# Patient Record
Sex: FEMALE | Race: ASIAN | NOT HISPANIC OR LATINO | Employment: FULL TIME | URBAN - METROPOLITAN AREA
[De-identification: names, ages, dates, MRNs, and addresses within clinical notes are randomized per-mention and may not be internally consistent; named-entity substitution may affect disease eponyms.]

---

## 2017-01-12 ENCOUNTER — OFFICE VISIT (OUTPATIENT)
Dept: RHEUMATOLOGY | Facility: CLINIC | Age: 47
End: 2017-01-12
Attending: INTERNAL MEDICINE
Payer: COMMERCIAL

## 2017-01-12 VITALS
BODY MASS INDEX: 25.97 KG/M2 | DIASTOLIC BLOOD PRESSURE: 80 MMHG | SYSTOLIC BLOOD PRESSURE: 121 MMHG | OXYGEN SATURATION: 100 % | WEIGHT: 133 LBS | HEART RATE: 93 BPM

## 2017-01-12 DIAGNOSIS — R07.89 ATYPICAL CHEST PAIN: ICD-10-CM

## 2017-01-12 DIAGNOSIS — R06.09 DOE (DYSPNEA ON EXERTION): ICD-10-CM

## 2017-01-12 DIAGNOSIS — R12 PYROSIS: ICD-10-CM

## 2017-01-12 DIAGNOSIS — M35.1 MIXED CONNECTIVE TISSUE DISEASE (H): Primary | ICD-10-CM

## 2017-01-12 DIAGNOSIS — K21.9 GASTROESOPHAGEAL REFLUX DISEASE, ESOPHAGITIS PRESENCE NOT SPECIFIED: ICD-10-CM

## 2017-01-12 DIAGNOSIS — M35.1 MIXED CONNECTIVE TISSUE DISEASE (H): ICD-10-CM

## 2017-01-12 LAB
CARDIOLIPIN ANTIBODY IGG: NORMAL GPL-U/ML (ref 0–19.9)
CARDIOLIPIN ANTIBODY IGM: 0.6 MPL-U/ML (ref 0–19.9)

## 2017-01-12 PROCEDURE — 99212 OFFICE O/P EST SF 10 MIN: CPT | Mod: ZF

## 2017-01-12 PROCEDURE — 86235 NUCLEAR ANTIGEN ANTIBODY: CPT | Performed by: INTERNAL MEDICINE

## 2017-01-12 PROCEDURE — 86147 CARDIOLIPIN ANTIBODY EA IG: CPT | Performed by: INTERNAL MEDICINE

## 2017-01-12 PROCEDURE — 36415 COLL VENOUS BLD VENIPUNCTURE: CPT | Performed by: INTERNAL MEDICINE

## 2017-01-12 PROCEDURE — 86146 BETA-2 GLYCOPROTEIN ANTIBODY: CPT | Performed by: INTERNAL MEDICINE

## 2017-01-12 PROCEDURE — 85730 THROMBOPLASTIN TIME PARTIAL: CPT | Performed by: INTERNAL MEDICINE

## 2017-01-12 PROCEDURE — 00000401 ZZHCL STATISTIC THROMBIN TIME NC: Performed by: INTERNAL MEDICINE

## 2017-01-12 PROCEDURE — 00000167 ZZHCL STATISTIC INR NC: Performed by: INTERNAL MEDICINE

## 2017-01-12 PROCEDURE — 85613 RUSSELL VIPER VENOM DILUTED: CPT | Performed by: INTERNAL MEDICINE

## 2017-01-12 ASSESSMENT — PAIN SCALES - GENERAL: PAINLEVEL: NO PAIN (0)

## 2017-01-12 NOTE — PROGRESS NOTES
Rheumatology Visit     Stephanie Reed MRN# [id number]   YOB: 1970 Age: 43 year old       Primary care provider: Tori Severino          Assessment and Plan:   PROBLEM LIST:   1. Mixed connective tissue disease with high titer RNP positivity, low titer rheumatoid factor and clinically with predominantly scleroderma features with typical skin thickening and skin biopsy consistent with the diagnosis in 2006.   2. Good improvement of skin with phototherapy plus CellCept managed through Dr. Sarmiento.  3. Marked sicca symptoms improved with Evoxac.  4. GERD with normal gastric motility study, nonetheless requiring Prilosec for control.  5. Cardiopulmonary symptoms with history of slightly elevated NT-proBNP currently normal and essentially normal echocardiogram in December of 2007 showing only trace TR, but with recent development of several-minute long symptoms of palpitations and difficulty breathing.   6. Intermittently painful stiff joints somewhat improved with ibuprofen.  7. History of carpal tunnel syndrome.   8. Vitamin D deficiency.  9. Current worsening joint symptoms and allopecia.    ASSESSMENT:      Although she feels overall stable these symptoms of chest pain are potentially worrisome.  The fact that she has underlying MCTD makes the possibility that they are something concerning greater than were she not to have this diagnosis.      Nonetheless, I think the most likely explanation for these is that they are esophageal in nature.  Her history is a little inconsistent about the likelihood of this, but I think it is the most likely explanation.      In that regard, I have told her that I would like her to take Maalox when this happens and see if she gets fairly immediate relief of the symptoms.  In the meantime however, I do want to check a phospholipid antibody workup as this would increase my concern that this could be small PEs in the lungs that might not show up easily on most testing.      I  also want her to repeat PFTs.  If those show significant change, then I think we should to a repeat HRCT and can probably do CT PE protocol at the same time, although I think it is very low yield based on the story.      I will plan on seeing her back in 4-6 months and of course we will review the tests in the meantime.  She will call sooner if she is having worsening symptoms like this or other new symptoms.  Ultimately, if she is having worsening symptoms and they seem to localize to the esophagus, she will need an upper endoscopy, but I think that is premature based on the description of things today.                   History of Present Illness:   Stephanie Reed is a 43 year old female who presents for Followup of her MCTD with high-titer RNP antibodies, borderline rheumatoid factor, sclerodactyly, alopecia, joint symptoms, and low pulmonary volumes and DLCO on prior workup.     Since we saw her in July she has felt overall stable but several months ago had several episodes that became increasingly frequent, where she had a sensation of difficulty breathing and also some pain in her chest.  Because of this, she went to urgent care on one occasion and ultimately wound up some time later in the emergency room.  She was on a cardiac monitor and there is no evidence of any cardiac issues and I think she was ruled out by enzymes.  She also got a chest x-ray that was unremarkable.  Her history really was not very consistent with a PE and a D-dimer was normal, so no PE chest protocol was run.      She was having these episodes fairly frequently at that time and they have diminished, but she still gets them.  Although she has denied GERD at times in the past, she clearly validates that she has GERD at this point.  It is less clear whether there is a relationship between her GERD and these attacks but it sounds as though there may be.  She really does not feel profoundly short of breath and her chest pain is really  mid-sternal, indicating that it could be esophageal.  Looking back through her record, she had a CT scan of the chest done almost 10 years ago.  That did not show clear-cut patulous esophagus.  I have reviewed her chest x-rays and looked at those directly today and they do not show evidence of that either in any clear way, but of course, she did not have contrast with the chest CT making this less interpretable and she has never had a barium esophagram, nor has she had an upper endoscopy.      Her last PFTs were over a year ago now.  She does not really think she is having any increased dyspnea on exertion.      In general, her other symptoms have been stable.          She was complaining of significant joint pains last time and she thinks those are better and she also thinks her skin has gradually improved.  Alopecia has not worsened and may also be improved.      She continues to get some Raynaud's attacks with more severe cold weather, but denies any digital ulcers.      She denies any other significant clinical features typically associated with systemic sclerosis.       Remainder of the full 12-point review of systems including the specific scleroderma intake form which is appended is otherwise negative.             Review of Systems:   Review Of Systems  Skin: negative  Eyes: negative  Ears/Nose/Throat: negative  Respiratory: No shortness of breath, dyspnea on exertion, cough, or hemoptysis  Cardiovascular: negative  Gastrointestinal: negative  Genitourinary: negative  Musculoskeletal: negative  Neurologic: negative  Psychiatric: negative  Hematologic/Lymphatic/Immunologic: negative  Endocrine: negative          Past Medical History:     Past Medical History   Diagnosis Date     Carpal tunnel syndrome      Menorrhagia      with anemia     Nephrolithiasis      Heartburn      Iron deficiency anemia      Long-term use of Plaquenil      Dry eye        Patient Active Problem List    Diagnosis Date Noted     ERNST  "(dyspnea on exertion) 01/12/2017     Priority: Medium     Atypical chest pain 10/28/2016     Priority: Medium     Ordered Holter. Will try increasing PPI .        Bilateral low back pain with left-sided sciatica 07/13/2016     Priority: Medium     Low back pain 06/03/2015     Priority: Medium     Diagnosis updated by automated process. Provider to review and confirm.       Low back pain 05/30/2015     Priority: Medium     Scleroderma (H) 04/10/2014     Priority: Medium     LABS: MOISE >10. RNP positive. ds DNA negative.Jo1 negative. SSA negative. CK normal   CHEST CT: axillary adenopathy (2006)   ECHO: 12/04/07 EF 55%. RVSP normal Tr PG 14 mm Hg.   NM gastric emptying study: normal ;EGD 4/07 gastropathy   Skin biopsy (Jan 2007) c/w \"morphea.\" R forearm.        Seasonal allergic rhinitis 04/10/2014     Priority: Medium     Mixed connective tissue disease (H) 04/10/2014     Priority: Medium     Vitamin D deficiency 04/10/2014     Priority: Medium     Systemic lupus erythematosus (H) 04/10/2014     Priority: Medium     Sicca syndrome (H) 04/10/2014     Priority: Medium     Raynaud phenomenon 04/10/2014     Priority: Medium             Past Surgical History:     Past Surgical History   Procedure Laterality Date     Tubal ligation               Social History:     Social History   Substance Use Topics     Smoking status: Never Smoker      Smokeless tobacco: Not on file      Comment:  smokes outside only and not in the car      Alcohol Use: No             Family History:     Family History   Problem Relation Age of Onset     Adopted: Yes     Unknown/Adopted       Glaucoma No family hx of      Macular Degeneration No family hx of             Allergies:   No Known Allergies          Medications:     Current Outpatient Prescriptions   Medication Sig Dispense Refill     loratadine (CLARITIN) 10 MG tablet Take 1 tablet (10 mg) by mouth daily 30 tablet 11     hydroxychloroquine (PLAQUENIL) 200 MG tablet Take 1 tablet (200 " mg) by mouth daily (with dinner) Annual eye exams for monitoring. 30 tablet 3     ferrous sulfate 325 (65 FE) MG tablet Take 1 tablet (325 mg) by mouth 2 times daily 60 tablet 2     artificial tears SOLN Instill one drop twice daily              Physical Exam:   Blood pressure 121/80, pulse 93, weight 60.328 kg (133 lb), SpO2 100 %, not currently breastfeeding.  Constitutional: WD-WN-WG cooperative  Eyes: nl EOM, PERRLA, vision, conjunctiva, sclera  ENT: nl external ears, nose, hearing, lips, teeth, gums, throat  No mucous membrane lesions, normal saliva pool  Neck: no mass or thyroid enlargement  Resp: lungs clear to auscultation, nl to palpation  CV: RRR, no murmurs, rubs or gallops, no edema  GI: no ABD mass or tenderness, no HSM  : not tested  Lymph: no cervical, supraclavicular, inguinal or epitrochlear nodes  MS: Slight MCP/PIP fullness, with trace tenderness. All other TMJ, neck, shoulder, elbow, wrist, MCP/PIP/DIP, spine, hip, knee, ankle, and foot MTP/IP joints were examined and  found normal. No active synovitis or deformity. Full ROM.  Normal  strength. No dactylitis,  tenosynovitis, enthespathy   Skin: Distal sclerodactyly, with some distal digital pits. . Minimal if any skin thickening elsewhere. Allopecia areata.  no nail pitting, , rash, nodules or other lesions  Neuro: nl cranial nerves, strength, sensation, DTRs.   Psych: nl judgement, orientation, memory, affect.         Data:     Lab Results   Component Value Date    WBC 3.1* 12/22/2011    WBC 9.0 12/16/2010    WBC 2.5* 9/30/2010    HGB 10.5* 4/10/2014    HGB 11.1* 12/22/2011    HGB 11.6* 12/16/2010    HCT 35.1 4/10/2014    HCT 34.7* 12/22/2011    HCT 34.5* 12/16/2010    MCV 76.2* 4/10/2014    MCV 86 12/22/2011    MCV 86 12/16/2010     12/22/2011     12/16/2010     9/30/2010     Lab Results   Component Value Date    BUN 10 4/10/2014    BUN 18 3/8/2012    BUN 13 6/21/2011     No components found with this basename:  SEDRATE     Lab Results   Component Value Date    TSH 1.09 12/22/2011    TSH 1.28 10/20/2009    TSH 1.46 6/14/2006     Lab Results   Component Value Date    AST 30 4/10/2014    AST 37 12/22/2011    AST 23 6/21/2011    ALT 23 4/10/2014    ALT 22 12/22/2011    ALT 17 6/21/2011    ALKPHOS 47 12/22/2011    ALKPHOS 53 6/21/2011    ALKPHOS 62 9/14/2010     Reviewed Rheumatology lab flowsheet    Efrain Mckinney

## 2017-01-12 NOTE — MR AVS SNAPSHOT
After Visit Summary   1/12/2017    Stephanie Reed    MRN: [id number]           Patient Information     Date Of Birth          1970        Visit Information        Provider Department      1/12/2017 8:00 AM Efrain Mckinney MD [facility] Rheumatology        Today's Diagnoses     Mixed connective tissue disease (H)    -  1     ERNST (dyspnea on exertion)         Atypical chest pain         Gastroesophageal reflux disease, esophagitis presence not specified         Pyrosis            Follow-ups after your visit        Your next 10 appointments already scheduled     Jan 12, 2017  9:15 AM   Lab with  LAB   [facility] ([facility])    [address] [place] [id number] [phone]            Jan 18, 2017  8:30 AM   PFT VISIT with  PFL B   [facility] Pulmonary Function Testing ([facility])    [address] [place] [id number] [phone]            Jul 13, 2017  7:30 AM   (Arrive by 7:15 AM)   RETURN SCLERODERMA with Efrain Mckinney MD   [facility] Rheumatology ([facility])    [address] [place] [id number] [phone]              Future tests that were ordered for you today     Open Future Orders        Priority Expected Expires Ordered    LUPUS PANEL INCL PT, PTT, & TT Routine  1/12/2018 1/12/2017    Beta 2 Glycoprotein 1 Antibody IgG Routine  1/12/2018 1/12/2017    ERICK Panel (RNP, SMITH, Scleroderma, SSAB, SSBB); ERICK, Antibodies, Panel Routine  1/12/2018 1/12/2017    General PFT Lab (Please always keep checked) Routine  1/12/2018 1/12/2017    Pulmonary Function Test Routine  1/12/2018 1/12/2017            Who to contact     If you have questions or need follow up information about today's clinic visit or your schedule please contact [facility] RHEUMATOLOGY directly at [phone].  Normal or non-critical lab and imaging results will be  communicated to you by [employer], letter or phone within 4 business days after the clinic has received the results. If you do not hear from us within 7 days, please contact the clinic through [employer] or phone. If you have a critical or abnormal lab result, we will notify you by phone as soon as possible.  Submit refill requests through [employer] or call your pharmacy and they will forward the refill request to us. Please allow 3 business days for your refill to be completed.          Additional Information About Your Visit        [employer] Information     [employer] gives you secure access to your electronic health record. If you see a primary care provider, you can also send messages to your care team and make appointments. If you have questions, please call your primary care clinic.  If you do not have a primary care provider, please call [phone] and they will assist you.        Care EveryWhere ID     This is your Care EveryWhere ID. This could be used by other organizations to access your [facility] medical records  [id number]        Your Vitals Were     Pulse Pulse Oximetry                93 100%           Blood Pressure from Last 3 Encounters:   01/12/17 121/80   10/26/16 114/72   10/21/16 108/68    Weight from Last 3 Encounters:   01/12/17 60.328 kg (133 lb)   10/26/16 58.695 kg (129 lb 6.4 oz)   10/21/16 58.06 kg (128 lb)              We Performed the Following     Cardiolipin Zoe IgG and IgM        Primary Care Provider Office Phone # Fax #    Tori Severino [phone]       [facility] [address] [address]        Thank you!     Thank you for choosing [facility] RHEUMATOLOGY  for your care. Our goal is always to provide you with excellent care. Hearing back from our patients is one way we can continue to improve our services. Please take a few minutes to complete the written survey that you may receive in the mail after your visit with us. Thank you!              Your Updated Medication List - Protect others around you: Learn how to safely use, store and throw away your medicines at www.disposemymeds.org.          This list is accurate as of: 1/12/17  9:03 AM.  Always use your most recent med list.                   Brand Name Dispense Instructions for use    artificial tears Soln      Instill one drop twice daily       ferrous sulfate 325 (65 FE) MG tablet    IRON    60 tablet    Take 1 tablet (325 mg) by mouth 2 times daily       hydroxychloroquine 200 MG tablet    PLAQUENIL    30 tablet    Take 1 tablet (200 mg) by mouth daily (with dinner) Annual eye exams for monitoring.       loratadine 10 MG tablet    CLARITIN    30 tablet    Take 1 tablet (10 mg) by mouth daily

## 2017-01-12 NOTE — Clinical Note
1/12/2017      RE: Stephanie Reed  [address]  [facility] [address]       Rheumatology Visit     Stephanie Reed MRN# [id number]   YOB: 1970 Age: 43 year old       Primary care provider: Tori Severino          Assessment and Plan:   PROBLEM LIST:   1. Mixed connective tissue disease with high titer RNP positivity, low titer rheumatoid factor and clinically with predominantly scleroderma features with typical skin thickening and skin biopsy consistent with the diagnosis in 2006.   2. Good improvement of skin with phototherapy plus CellCept managed through Dr. Sarmiento.  3. Marked sicca symptoms improved with Evoxac.  4. GERD with normal gastric motility study, nonetheless requiring Prilosec for control.  5. Cardiopulmonary symptoms with history of slightly elevated NT-proBNP currently normal and essentially normal echocardiogram in December of 2007 showing only trace TR, but with recent development of several-minute long symptoms of palpitations and difficulty breathing.   6. Intermittently painful stiff joints somewhat improved with ibuprofen.  7. History of carpal tunnel syndrome.   8. Vitamin D deficiency.  9. Current worsening joint symptoms and allopecia.    ASSESSMENT:      Although she feels overall stable these symptoms of chest pain are potentially worrisome.  The fact that she has underlying MCTD makes the possibility that they are something concerning greater than were she not to have this diagnosis.      Nonetheless, I think the most likely explanation for these is that they are esophageal in nature.  Her history is a little inconsistent about the likelihood of this, but I think it is the most likely explanation.      In that regard, I have told her that I would like her to take Maalox when this happens and see if she gets fairly immediate relief of the symptoms.  In the meantime however, I do want to check a phospholipid antibody workup as this would increase my concern that this could  be small PEs in the lungs that might not show up easily on most testing.      I also want her to repeat PFTs.  If those show significant change, then I think we should to a repeat HRCT and can probably do CT PE protocol at the same time, although I think it is very low yield based on the story.      I will plan on seeing her back in 4-6 months and of course we will review the tests in the meantime.  She will call sooner if she is having worsening symptoms like this or other new symptoms.  Ultimately, if she is having worsening symptoms and they seem to localize to the esophagus, she will need an upper endoscopy, but I think that is premature based on the description of things today.                   History of Present Illness:   Stephanie Reed is a 43 year old female who presents for Followup of her MCTD with high-titer RNP antibodies, borderline rheumatoid factor, sclerodactyly, alopecia, joint symptoms, and low pulmonary volumes and DLCO on prior workup.     Since we saw her in July she has felt overall stable but several months ago had several episodes that became increasingly frequent, where she had a sensation of difficulty breathing and also some pain in her chest.  Because of this, she went to urgent care on one occasion and ultimately wound up some time later in the emergency room.  She was on a cardiac monitor and there is no evidence of any cardiac issues and I think she was ruled out by enzymes.  She also got a chest x-ray that was unremarkable.  Her history really was not very consistent with a PE and a D-dimer was normal, so no PE chest protocol was run.      She was having these episodes fairly frequently at that time and they have diminished, but she still gets them.  Although she has denied GERD at times in the past, she clearly validates that she has GERD at this point.  It is less clear whether there is a relationship between her GERD and these attacks but it sounds as though there may be.  She  really does not feel profoundly short of breath and her chest pain is really mid-sternal, indicating that it could be esophageal.  Looking back through her record, she had a CT scan of the chest done almost 10 years ago.  That did not show clear-cut patulous esophagus.  I have reviewed her chest x-rays and looked at those directly today and they do not show evidence of that either in any clear way, but of course, she did not have contrast with the chest CT making this less interpretable and she has never had a barium esophagram, nor has she had an upper endoscopy.      Her last PFTs were over a year ago now.  She does not really think she is having any increased dyspnea on exertion.      In general, her other symptoms have been stable.          She was complaining of significant joint pains last time and she thinks those are better and she also thinks her skin has gradually improved.  Alopecia has not worsened and may also be improved.      She continues to get some Raynaud's attacks with more severe cold weather, but denies any digital ulcers.      She denies any other significant clinical features typically associated with systemic sclerosis.       Remainder of the full 12-point review of systems including the specific scleroderma intake form which is appended is otherwise negative.             Review of Systems:   Review Of Systems  Skin: negative  Eyes: negative  Ears/Nose/Throat: negative  Respiratory: No shortness of breath, dyspnea on exertion, cough, or hemoptysis  Cardiovascular: negative  Gastrointestinal: negative  Genitourinary: negative  Musculoskeletal: negative  Neurologic: negative  Psychiatric: negative  Hematologic/Lymphatic/Immunologic: negative  Endocrine: negative          Past Medical History:     Past Medical History   Diagnosis Date     Carpal tunnel syndrome      Menorrhagia      with anemia     Nephrolithiasis      Heartburn      Iron deficiency anemia      Long-term use of Plaquenil       "Dry eye        Patient Active Problem List    Diagnosis Date Noted     ERNST (dyspnea on exertion) 01/12/2017     Priority: Medium     Atypical chest pain 10/28/2016     Priority: Medium     Ordered Holter. Will try increasing PPI .        Bilateral low back pain with left-sided sciatica 07/13/2016     Priority: Medium     Low back pain 06/03/2015     Priority: Medium     Diagnosis updated by automated process. Provider to review and confirm.       Low back pain 05/30/2015     Priority: Medium     Scleroderma (H) 04/10/2014     Priority: Medium     LABS: MOISE >10. RNP positive. ds DNA negative.Jo1 negative. SSA negative. CK normal   CHEST CT: axillary adenopathy (2006)   ECHO: 12/04/07 EF 55%. RVSP normal Tr PG 14 mm Hg.   NM gastric emptying study: normal ;EGD 4/07 gastropathy   Skin biopsy (Jan 2007) c/w \"morphea.\" R forearm.        Seasonal allergic rhinitis 04/10/2014     Priority: Medium     Mixed connective tissue disease (H) 04/10/2014     Priority: Medium     Vitamin D deficiency 04/10/2014     Priority: Medium     Systemic lupus erythematosus (H) 04/10/2014     Priority: Medium     Sicca syndrome (H) 04/10/2014     Priority: Medium     Raynaud phenomenon 04/10/2014     Priority: Medium             Past Surgical History:     Past Surgical History   Procedure Laterality Date     Tubal ligation               Social History:     Social History   Substance Use Topics     Smoking status: Never Smoker      Smokeless tobacco: Not on file      Comment:  smokes outside only and not in the car      Alcohol Use: No             Family History:     Family History   Problem Relation Age of Onset     Adopted: Yes     Unknown/Adopted       Glaucoma No family hx of      Macular Degeneration No family hx of             Allergies:   No Known Allergies          Medications:     Current Outpatient Prescriptions   Medication Sig Dispense Refill     loratadine (CLARITIN) 10 MG tablet Take 1 tablet (10 mg) by mouth daily 30 " tablet 11     hydroxychloroquine (PLAQUENIL) 200 MG tablet Take 1 tablet (200 mg) by mouth daily (with dinner) Annual eye exams for monitoring. 30 tablet 3     ferrous sulfate 325 (65 FE) MG tablet Take 1 tablet (325 mg) by mouth 2 times daily 60 tablet 2     artificial tears SOLN Instill one drop twice daily              Physical Exam:   Blood pressure 121/80, pulse 93, weight 60.328 kg (133 lb), SpO2 100 %, not currently breastfeeding.  Constitutional: WD-WN-WG cooperative  Eyes: nl EOM, PERRLA, vision, conjunctiva, sclera  ENT: nl external ears, nose, hearing, lips, teeth, gums, throat  No mucous membrane lesions, normal saliva pool  Neck: no mass or thyroid enlargement  Resp: lungs clear to auscultation, nl to palpation  CV: RRR, no murmurs, rubs or gallops, no edema  GI: no ABD mass or tenderness, no HSM  : not tested  Lymph: no cervical, supraclavicular, inguinal or epitrochlear nodes  MS: Slight MCP/PIP fullness, with trace tenderness. All other TMJ, neck, shoulder, elbow, wrist, MCP/PIP/DIP, spine, hip, knee, ankle, and foot MTP/IP joints were examined and  found normal. No active synovitis or deformity. Full ROM.  Normal  strength. No dactylitis,  tenosynovitis, enthespathy   Skin: Distal sclerodactyly, with some distal digital pits. . Minimal if any skin thickening elsewhere. Allopecia areata.  no nail pitting, , rash, nodules or other lesions  Neuro: nl cranial nerves, strength, sensation, DTRs.   Psych: nl judgement, orientation, memory, affect.         Data:     Lab Results   Component Value Date    WBC 3.1* 12/22/2011    WBC 9.0 12/16/2010    WBC 2.5* 9/30/2010    HGB 10.5* 4/10/2014    HGB 11.1* 12/22/2011    HGB 11.6* 12/16/2010    HCT 35.1 4/10/2014    HCT 34.7* 12/22/2011    HCT 34.5* 12/16/2010    MCV 76.2* 4/10/2014    MCV 86 12/22/2011    MCV 86 12/16/2010     12/22/2011     12/16/2010     9/30/2010     Lab Results   Component Value Date    BUN 10 4/10/2014    BUN 18  3/8/2012    BUN 13 6/21/2011     No components found with this basename: SEDRATE     Lab Results   Component Value Date    TSH 1.09 12/22/2011    TSH 1.28 10/20/2009    TSH 1.46 6/14/2006     Lab Results   Component Value Date    AST 30 4/10/2014    AST 37 12/22/2011    AST 23 6/21/2011    ALT 23 4/10/2014    ALT 22 12/22/2011    ALT 17 6/21/2011    ALKPHOS 47 12/22/2011    ALKPHOS 53 6/21/2011    ALKPHOS 62 9/14/2010     Reviewed Rheumatology lab flowsheet    Efrain Mckinney MD

## 2017-01-13 LAB
ENA RNP IGG SER IA-ACNC: ABNORMAL AI (ref 0–0.9)
ENA SCL70 IGG SER IA-ACNC: 0.2 AI (ref 0–0.9)
ENA SM IGG SER-ACNC: 0.2 AI (ref 0–0.9)
ENA SS-A IGG SER IA-ACNC: 2.3 AI (ref 0–0.9)
ENA SS-B IGG SER IA-ACNC: ABNORMAL AI (ref 0–0.9)
LA PPP-IMP: NORMAL

## 2017-01-17 LAB — B2 GLYCOPROT1 IGG SERPL IA-ACNC: NORMAL U/ML

## 2017-01-18 DIAGNOSIS — R12 PYROSIS: ICD-10-CM

## 2017-01-18 DIAGNOSIS — R06.09 DOE (DYSPNEA ON EXERTION): ICD-10-CM

## 2017-01-18 DIAGNOSIS — M35.1 MIXED CONNECTIVE TISSUE DISEASE (H): ICD-10-CM

## 2017-01-18 DIAGNOSIS — K21.9 GASTROESOPHAGEAL REFLUX DISEASE, ESOPHAGITIS PRESENCE NOT SPECIFIED: ICD-10-CM

## 2017-01-18 DIAGNOSIS — R07.89 ATYPICAL CHEST PAIN: ICD-10-CM

## 2017-02-13 LAB
DLCOUNC-%PRED-PRE: 83 %
DLCOUNC-PRE: 17.41 ML/MIN/MMHG
DLCOUNC-PRED: 20.77 ML/MIN/MMHG
ERV-%PRED-PRE: 13 %
ERV-PRE: 0.1 L
ERV-PRED: 0.74 L
EXPTIME-PRE: 7.19 SEC
FEF2575-%PRED-PRE: 131 %
FEF2575-PRE: 3.13 L/SEC
FEF2575-PRED: 2.38 L/SEC
FEFMAX-%PRED-PRE: 114 %
FEFMAX-PRE: 6.86 L/SEC
FEFMAX-PRED: 6 L/SEC
FEV1-%PRED-PRE: 89 %
FEV1-PRE: 1.96 L
FEV1FEV6-PRE: 89 %
FEV1FEV6-PRED: 83 %
FEV1FVC-PRE: 88 %
FEV1FVC-PRED: 83 %
FEV1SVC-PRE: 84 %
FEV1SVC-PRED: 77 %
FIFMAX-PRE: 3.36 L/SEC
FRCPLETH-%PRED-PRE: 66 %
FRCPLETH-PRE: 1.58 L
FRCPLETH-PRED: 2.38 L
FVC-%PRED-PRE: 84 %
FVC-PRE: 2.22 L
FVC-PRED: 2.64 L
IC-%PRED-PRE: 106 %
IC-PRE: 2.23 L
IC-PRED: 2.09 L
RVPLETH-%PRED-PRE: 102 %
RVPLETH-PRE: 1.48 L
RVPLETH-PRED: 1.45 L
TLCPLETH-%PRED-PRE: 94 %
TLCPLETH-PRE: 3.81 L
TLCPLETH-PRED: 4.02 L
VA-%PRED-PRE: 72 %
VA-PRE: 3 L
VC-%PRED-PRE: 82 %
VC-PRE: 2.33 L
VC-PRED: 2.83 L

## 2017-06-24 ENCOUNTER — HEALTH MAINTENANCE LETTER (OUTPATIENT)
Age: 47
End: 2017-06-24

## 2017-08-03 ENCOUNTER — OFFICE VISIT (OUTPATIENT)
Dept: RHEUMATOLOGY | Facility: CLINIC | Age: 47
End: 2017-08-03
Attending: INTERNAL MEDICINE
Payer: COMMERCIAL

## 2017-08-03 VITALS
HEART RATE: 79 BPM | HEIGHT: 59 IN | OXYGEN SATURATION: 100 % | BODY MASS INDEX: 25.96 KG/M2 | DIASTOLIC BLOOD PRESSURE: 77 MMHG | WEIGHT: 128.8 LBS | SYSTOLIC BLOOD PRESSURE: 118 MMHG

## 2017-08-03 DIAGNOSIS — M34.9 SCLERODERMA (H): ICD-10-CM

## 2017-08-03 DIAGNOSIS — M35.1 MIXED CONNECTIVE TISSUE DISEASE (H): ICD-10-CM

## 2017-08-03 DIAGNOSIS — M35.00 SICCA SYNDROME (H): ICD-10-CM

## 2017-08-03 DIAGNOSIS — M32.8 OTHER FORMS OF SYSTEMIC LUPUS ERYTHEMATOSUS (H): ICD-10-CM

## 2017-08-03 DIAGNOSIS — E55.9 VITAMIN D DEFICIENCY: ICD-10-CM

## 2017-08-03 PROCEDURE — 99212 OFFICE O/P EST SF 10 MIN: CPT | Mod: ZF

## 2017-08-03 RX ORDER — HYDROXYCHLOROQUINE SULFATE 200 MG/1
200 TABLET, FILM COATED ORAL
Qty: 30 TABLET | Refills: 3 | Status: SHIPPED | OUTPATIENT
Start: 2017-08-03 | End: 2018-08-02

## 2017-08-03 ASSESSMENT — PAIN SCALES - GENERAL: PAINLEVEL: NO PAIN (0)

## 2017-08-03 NOTE — MR AVS SNAPSHOT
After Visit Summary   8/3/2017    Stephanie Reed    MRN: [id number]           Patient Information     Date Of Birth          1970        Visit Information        Provider Department      8/3/2017 8:00 AM Efrain Mckinney MD [facility] Rheumatology        Today's Diagnoses     Scleroderma (H)        Mixed connective tissue disease (H)        Other forms of systemic lupus erythematosus (H)        Sicca syndrome (H)        Vitamin D deficiency          Care Instructions    Make an appointment with Dr. Logan in ophthalmology to screen for Plaquenil side effects.    Pumice stone.          Follow-ups after your visit        Your next 10 appointments already scheduled     Aug 16, 2017  8:20 AM CDT   PHYSICAL with Tori Severino MD   [facility] ([facility])    [address],  [address]  [facility] [address]   [phone]            Sep 19, 2017  9:45 AM CDT   RETURN GENERAL with Craig Logan MD   [facility] Clinic ([facility])    [facility]  [address]  [address]  [facility] [address] [phone]            Feb 01, 2018  7:00 AM CST   (Arrive by 6:45 AM)   RETURN SCLERODERMA with Efrain Mckinney MD   [facility] ([facility])    [address]  [address] [facility] [id number] [phone]              Who to contact     If you have questions or need follow up information about today's clinic visit or your schedule please contact [facility] RHEUMATOLOG[facility] directly at [phone].  Normal or non-critical lab and imaging results will be communicated to you by MyChart, letter or phone within 4 business days after the clinic has received the results. If you do not hear from us within 7 days, please contact the clinic through MyChart or phone. If you have a critical or abnormal lab result, we will notify you by phone as soon as possible.  Submit refill requests through [facility] or call your  "pharmacy and they will forward the refill request to us. Please allow 3 business days for your refill to be completed.          Additional Information About Your Visit        MyChart Information     [employer] gives you secure access to your electronic health record. If you see a primary care provider, you can also send messages to your care team and make appointments. If you have questions, please call your primary care clinic.  If you do not have a primary care provider, please call [phone] and they will assist you.        Care EveryWhere ID     This is your Care EveryWhere ID. This could be used by other organizations to access your [facility] medical records  [id number]        Your Vitals Were     Pulse Height Pulse Oximetry BMI (Body Mass Index)          79 1.492 m (4' 10.75\") 100% 26.24 kg/m2         Blood Pressure from Last 3 Encounters:   08/03/17 118/77   01/12/17 121/80   10/26/16 114/72    Weight from Last 3 Encounters:   08/03/17 58.4 kg (128 lb 12.8 oz)   01/12/17 60.3 kg (133 lb)   10/26/16 58.7 kg (129 lb 6.4 oz)              Today, you had the following     No orders found for display         Where to get your medicines      These medications were sent to [facility] [address] [place] [id number]     Phone:  [phone]     hydroxychloroquine 200 MG tablet          Primary Care Provider Office Phone # Fax #    Tori Severino [phone]       [facility] [address] [address]        Equal Access to Services     [facility] : Hadii aad ku hadasho Soomaali, waaxda luqadaha, qaybta kaalmada adeashantiyasugey, yogi long. So [facility] [phone].    ATENCIÓN: Si habla español, tiene a miller disposición servicios gratuitos de asistencia lingüística. Llame al [phone].    We comply with applicable federal civil rights laws and [place] laws. We do not " discriminate on the basis of race, color, national origin, age, disability sex, sexual orientation or gender identity.            Thank you!     Thank you for choosing [facility] RHEUMATOLOG[facility]  for your care. Our goal is always to provide you with excellent care. Hearing back from our patients is one way we can continue to improve our services. Please take a few minutes to complete the written survey that you may receive in the mail after your visit with us. Thank you!             Your Updated Medication List - Protect others around you: Learn how to safely use, store and throw away your medicines at www.disposemymeds.org.          This list is accurate as of: 8/3/17  9:07 AM.  Always use your most recent med list.                   Brand Name Dispense Instructions for use Diagnosis    artificial tears Soln      Instill one drop twice daily        ferrous sulfate 325 (65 FE) MG tablet    IRON    60 tablet    Take 1 tablet (325 mg) by mouth 2 times daily    Iron deficiency anemia       hydroxychloroquine 200 MG tablet    PLAQUENIL    30 tablet    Take 1 tablet (200 mg) by mouth daily (with dinner) Annual eye exams for monitoring.    Scleroderma (H), Mixed connective tissue disease (H), Other forms of systemic lupus erythematosus (H), Sicca syndrome (H), Vitamin D deficiency       loratadine 10 MG tablet    CLARITIN    30 tablet    Take 1 tablet (10 mg) by mouth daily    Other allergic rhinitis

## 2017-08-03 NOTE — LETTER
8/3/2017      RE: Stephanie Reed  [address]  [facility] [address]       Rheumatology Visit     Stephanie Reed MRN# [id number]   YOB: 1970 Age: 43 year old       Primary care provider: Tori Severino          Assessment and Plan:   PROBLEM LIST:   1. Mixed connective tissue disease with high titer RNP positivity, low titer rheumatoid factor and clinically with predominantly scleroderma features with typical skin thickening and skin biopsy consistent with the diagnosis in 2006.   2. Good improvement of skin with phototherapy plus CellCept managed through Dr. Sarmiento.  3. Marked sicca symptoms improved with Evoxac.  4. GERD with normal gastric motility study, nonetheless requiring Prilosec for control.  5. Cardiopulmonary symptoms with history of slightly elevated NT-proBNP currently normal and essentially normal echocardiogram in December of 2007 showing only trace TR, but with recent development of several-minute long symptoms of palpitations and difficulty breathing.   6. Intermittently painful stiff joints somewhat improved with ibuprofen.  7. History of carpal tunnel syndrome.   8. Vitamin D deficiency.  9. Current worsening joint symptoms and allopecia.    ASSESSMENT:      Overall Stephanie is essentially stable and has almost no residual sequelae from her MCTD. She is on Plaquenil therapy once daily and is due for an eye exam this summer.    She does have brief morning stiffness and numbness, the latter of which could represent nerve entrapment, but it is not particularly bothersome to her. Her Raynaud's is similar and a calcium channel blocker could be considered in the future if her symptoms become disabling during the winter months.    Will plan on seeing her back in 6-12 months for monitoring.     Seen and discussed with Dr. Mckinney.    Larry Blackwood MD  Rheumatology Fellow  [phone]         History of Present Illness:   Stephanie Reed is a 43 year old female who presents for Followup of  her MCTD with high-titer RNP antibodies, borderline rheumatoid factor, sclerodactyly, alopecia, joint symptoms, and low pulmonary volumes and DLCO on prior workup.     Since we saw her in January she has felt well.      She continues to get some Raynaud's attacks with more severe cold weather, but denies any digital ulcers. The attacks do not bother her well being or her ability to work and they resolve, usually within 15-20 minutes, with warming lifestyle measures.    Her hands are slightly stiff and even numb in the morning and sometimes towards the end of her sleep and it wakes her early. Her skin is largely normal now and she has been off CellCept for many years. She has not developed any new rashes or skin changes.    The chest pain she was worked up for last year was largely gone until last week when she had another brief episode that lasted under a minute and occurred while she was at work. It was on the right side of her chest and not associated with any shortness of breath.    She is not having any difficulty swallowing solids or liquids or having either get stuck. Breathing is normal and unencumbered. She took a trip to [place] for her niece's wedding and walked around [place] for 3 days without any problems. She saw some kangaroos but not a koala.          Review of Systems:   Skin: negative  Eyes: negative  Ears/Nose/Throat: negative  Respiratory: No shortness of breath, dyspnea on exertion, cough, or hemoptysis  Cardiovascular: negative  Gastrointestinal: negative  Genitourinary: negative  Musculoskeletal: negative  Neurologic: negative  Psychiatric: negative  Hematologic/Lymphatic/Immunologic: negative  Endocrine: negative          Past Medical History:     Past Medical History:   Diagnosis Date     Carpal tunnel syndrome      Dry eye      Heartburn      Iron deficiency anemia      Long-term use of Plaquenil      Menorrhagia     with anemia     Nephrolithiasis      Patient Active Problem List     "Diagnosis Date Noted     ERNST (dyspnea on exertion) 01/12/2017     Priority: Medium     Atypical chest pain 10/28/2016     Priority: Medium     Ordered Holter. Will try increasing PPI .        Bilateral low back pain with left-sided sciatica 07/13/2016     Priority: Medium     Low back pain 06/03/2015     Priority: Medium     Diagnosis updated by automated process. Provider to review and confirm.       Low back pain 05/30/2015     Priority: Medium     Scleroderma (H) 04/10/2014     Priority: Medium     LABS: MOISE >10. RNP positive. ds DNA negative.Jo1 negative. SSA negative. CK normal   CHEST CT: axillary adenopathy (2006)   ECHO: 12/04/07 EF 55%. RVSP normal Tr PG 14 mm Hg.   NM gastric emptying study: normal ;EGD 4/07 gastropathy   Skin biopsy (Jan 2007) c/w \"morphea.\" R forearm.        Seasonal allergic rhinitis 04/10/2014     Priority: Medium     Mixed connective tissue disease (H) 04/10/2014     Priority: Medium     Vitamin D deficiency 04/10/2014     Priority: Medium     Systemic lupus erythematosus (H) 04/10/2014     Priority: Medium     Sicca syndrome (H) 04/10/2014     Priority: Medium     Raynaud phenomenon 04/10/2014     Priority: Medium          Past Surgical History:     Past Surgical History:   Procedure Laterality Date     TUBAL LIGATION            Social History:     Social History   Substance Use Topics     Smoking status: Never Smoker     Smokeless tobacco: Not on file      Comment:  smokes outside only and not in the car      Alcohol use No          Family History:     Family History   Problem Relation Age of Onset     Adopted: Yes     Unknown/Adopted       Glaucoma No family hx of      Macular Degeneration No family hx of           Allergies:   No Known Allergies          Medications:     Current Outpatient Prescriptions   Medication Sig Dispense Refill     hydroxychloroquine (PLAQUENIL) 200 MG tablet Take 1 tablet (200 mg) by mouth daily (with dinner) Annual eye exams for monitoring. 30 " "tablet 3     loratadine (CLARITIN) 10 MG tablet Take 1 tablet (10 mg) by mouth daily 30 tablet 11     artificial tears SOLN Instill one drop twice daily       ferrous sulfate 325 (65 FE) MG tablet Take 1 tablet (325 mg) by mouth 2 times daily (Patient not taking: Reported on 8/3/2017) 60 tablet 2          Physical Exam:   Blood pressure 118/77, pulse 79, height 1.492 m (4' 10.75\"), weight 58.4 kg (128 lb 12.8 oz), SpO2 100 %, not currently breastfeeding.  Constitutional: WD-WN-WG cooperative  Eyes: nl EOM, PERRLA, vision, conjunctiva, sclera  ENT: nl external ears, nose, hearing, lips, teeth, gums, throat  No mucous membrane lesions, normal saliva pool  Neck: no mass or thyroid enlargement  Resp: lungs clear to auscultation, nl to palpation  CV: RRR, no murmurs, rubs or gallops, no edema  GI: no ABD mass or tenderness, no HSM  : not tested  Lymph: no cervical, supraclavicular, or epitrochlear nodes  MS: Slight MCP/PIP fullness, with trace tenderness. All other TMJ, neck, shoulder, elbow, wrist, MCP/PIP/DIP, spine, hip, knee, ankle, and foot MTP/IP joints were examined and  found normal. No active synovitis or deformity. Full ROM.  Normal  strength. No dactylitis,  tenosynovitis, enthespathy   Skin: Minimal sclerodactyly if any. Mild synovial hypertrophy MCP 2-3 bilaterally. Allopecia areata.  no nail pitting, , rash, nodules or other lesions  Neuro: nl cranial nerves, strength, sensation, DTRs.   Psych: nl judgement, orientation, memory, affect.         Data:   Results for YUE JAIMES (MRN [id number]) as of 8/3/2017 09:18   Ref. Range 1/12/2017 10:07   Beta 2 Glycoprotein 1 Antibody IgG Latest Ref Range: <7 U/mL <0.6...   RNP Antibody IgG Latest Ref Range: 0.0 - 0.9 AI >8.0... (H)   Scleroderma Antibody Scl-70 ERICK IgG Latest Ref Range: 0.0 - 0.9 AI 0.2   Dan ERICK Antibody IgG Latest Ref Range: 0.0 - 0.9 AI 0.2   SSA (Ro) (ERICK) Antibody, IgG Latest Ref Range: 0.0 - 0.9 AI 2.3 (H)   SSB (La) (ERICK) Antibody, " IgG Latest Ref Range: 0.0 - 0.9 AI <0.2...     Results for YUE JAIMES (MRN [id number]) as of 8/3/2017 09:18   Ref. Range 1/12/2017 10:08   Cardiolipin Antibody IgG Latest Ref Range: 0.0 - 19.9 GPL-U/mL <1.6...   Cardiolipin Antibody IgM Latest Ref Range: 0.0 - 19.9 MPL-U/mL 0.6   Results for YUE JAIMES (MRN [id number]) as of 8/3/2017 09:18   Ref. Range 1/12/2017 10:07   Lupus Result Latest Ref Range: NEG  Negative...     Reviewed Rheumatology lab flowsheet      I saw the patient with the fellow.  My exam and recomendations are as described.        Efrain Mckinney MD

## 2017-08-03 NOTE — PROGRESS NOTES
Rheumatology Visit     Stephanie Reed MRN# [id number]   YOB: 1970 Age: 43 year old       Primary care provider: Tori Severino          Assessment and Plan:   PROBLEM LIST:   1. Mixed connective tissue disease with high titer RNP positivity, low titer rheumatoid factor and clinically with predominantly scleroderma features with typical skin thickening and skin biopsy consistent with the diagnosis in 2006.   2. Good improvement of skin with phototherapy plus CellCept managed through Dr. Sarmiento.  3. Marked sicca symptoms improved with Evoxac.  4. GERD with normal gastric motility study, nonetheless requiring Prilosec for control.  5. Cardiopulmonary symptoms with history of slightly elevated NT-proBNP currently normal and essentially normal echocardiogram in December of 2007 showing only trace TR, but with recent development of several-minute long symptoms of palpitations and difficulty breathing.   6. Intermittently painful stiff joints somewhat improved with ibuprofen.  7. History of carpal tunnel syndrome.   8. Vitamin D deficiency.  9. Current worsening joint symptoms and allopecia.    ASSESSMENT:      Overall Stephanie is essentially stable and has almost no residual sequelae from her MCTD. She is on Plaquenil therapy once daily and is due for an eye exam this summer.    She does have brief morning stiffness and numbness, the latter of which could represent nerve entrapment, but it is not particularly bothersome to her. Her Raynaud's is similar and a calcium channel blocker could be considered in the future if her symptoms become disabling during the winter months.    Will plan on seeing her back in 6-12 months for monitoring.     Seen and discussed with Dr. Mckinney.    Larry Blackwood MD  Rheumatology Fellow  [phone]         History of Present Illness:   Stephanie Reed is a 43 year old female who presents for Followup of her MCTD with high-titer RNP antibodies, borderline rheumatoid factor,  sclerodactyly, alopecia, joint symptoms, and low pulmonary volumes and DLCO on prior workup.     Since we saw her in January she has felt well.      She continues to get some Raynaud's attacks with more severe cold weather, but denies any digital ulcers. The attacks do not bother her well being or her ability to work and they resolve, usually within 15-20 minutes, with warming lifestyle measures.    Her hands are slightly stiff and even numb in the morning and sometimes towards the end of her sleep and it wakes her early. Her skin is largely normal now and she has been off CellCept for many years. She has not developed any new rashes or skin changes.    The chest pain she was worked up for last year was largely gone until last week when she had another brief episode that lasted under a minute and occurred while she was at work. It was on the right side of her chest and not associated with any shortness of breath.    She is not having any difficulty swallowing solids or liquids or having either get stuck. Breathing is normal and unencumbered. She took a trip to [place] for her niece's wedding and walked around [place] for 3 days without any problems. She saw some kangaroos but not a koala.          Review of Systems:   Skin: negative  Eyes: negative  Ears/Nose/Throat: negative  Respiratory: No shortness of breath, dyspnea on exertion, cough, or hemoptysis  Cardiovascular: negative  Gastrointestinal: negative  Genitourinary: negative  Musculoskeletal: negative  Neurologic: negative  Psychiatric: negative  Hematologic/Lymphatic/Immunologic: negative  Endocrine: negative          Past Medical History:     Past Medical History:   Diagnosis Date     Carpal tunnel syndrome      Dry eye      Heartburn      Iron deficiency anemia      Long-term use of Plaquenil      Menorrhagia     with anemia     Nephrolithiasis      Patient Active Problem List    Diagnosis Date Noted     ERNST (dyspnea on exertion) 01/12/2017     Priority:  "Medium     Atypical chest pain 10/28/2016     Priority: Medium     Ordered Holter. Will try increasing PPI .        Bilateral low back pain with left-sided sciatica 07/13/2016     Priority: Medium     Low back pain 06/03/2015     Priority: Medium     Diagnosis updated by automated process. Provider to review and confirm.       Low back pain 05/30/2015     Priority: Medium     Scleroderma (H) 04/10/2014     Priority: Medium     LABS: MOISE >10. RNP positive. ds DNA negative.Jo1 negative. SSA negative. CK normal   CHEST CT: axillary adenopathy (2006)   ECHO: 12/04/07 EF 55%. RVSP normal Tr PG 14 mm Hg.   NM gastric emptying study: normal ;EGD 4/07 gastropathy   Skin biopsy (Jan 2007) c/w \"morphea.\" R forearm.        Seasonal allergic rhinitis 04/10/2014     Priority: Medium     Mixed connective tissue disease (H) 04/10/2014     Priority: Medium     Vitamin D deficiency 04/10/2014     Priority: Medium     Systemic lupus erythematosus (H) 04/10/2014     Priority: Medium     Sicca syndrome (H) 04/10/2014     Priority: Medium     Raynaud phenomenon 04/10/2014     Priority: Medium          Past Surgical History:     Past Surgical History:   Procedure Laterality Date     TUBAL LIGATION            Social History:     Social History   Substance Use Topics     Smoking status: Never Smoker     Smokeless tobacco: Not on file      Comment:  smokes outside only and not in the car      Alcohol use No          Family History:     Family History   Problem Relation Age of Onset     Adopted: Yes     Unknown/Adopted       Glaucoma No family hx of      Macular Degeneration No family hx of           Allergies:   No Known Allergies          Medications:     Current Outpatient Prescriptions   Medication Sig Dispense Refill     hydroxychloroquine (PLAQUENIL) 200 MG tablet Take 1 tablet (200 mg) by mouth daily (with dinner) Annual eye exams for monitoring. 30 tablet 3     loratadine (CLARITIN) 10 MG tablet Take 1 tablet (10 mg) by mouth " "daily 30 tablet 11     artificial tears SOLN Instill one drop twice daily       ferrous sulfate 325 (65 FE) MG tablet Take 1 tablet (325 mg) by mouth 2 times daily (Patient not taking: Reported on 8/3/2017) 60 tablet 2          Physical Exam:   Blood pressure 118/77, pulse 79, height 1.492 m (4' 10.75\"), weight 58.4 kg (128 lb 12.8 oz), SpO2 100 %, not currently breastfeeding.  Constitutional: WD-WN-WG cooperative  Eyes: nl EOM, PERRLA, vision, conjunctiva, sclera  ENT: nl external ears, nose, hearing, lips, teeth, gums, throat  No mucous membrane lesions, normal saliva pool  Neck: no mass or thyroid enlargement  Resp: lungs clear to auscultation, nl to palpation  CV: RRR, no murmurs, rubs or gallops, no edema  GI: no ABD mass or tenderness, no HSM  : not tested  Lymph: no cervical, supraclavicular, or epitrochlear nodes  MS: Slight MCP/PIP fullness, with trace tenderness. All other TMJ, neck, shoulder, elbow, wrist, MCP/PIP/DIP, spine, hip, knee, ankle, and foot MTP/IP joints were examined and  found normal. No active synovitis or deformity. Full ROM.  Normal  strength. No dactylitis,  tenosynovitis, enthespathy   Skin: Minimal sclerodactyly if any. Mild synovial hypertrophy MCP 2-3 bilaterally. Allopecia areata.  no nail pitting, , rash, nodules or other lesions  Neuro: nl cranial nerves, strength, sensation, DTRs.   Psych: nl judgement, orientation, memory, affect.         Data:   Results for YUE JAIMES (MRN [id number]) as of 8/3/2017 09:18   Ref. Range 1/12/2017 10:07   Beta 2 Glycoprotein 1 Antibody IgG Latest Ref Range: <7 U/mL <0.6...   RNP Antibody IgG Latest Ref Range: 0.0 - 0.9 AI >8.0... (H)   Scleroderma Antibody Scl-70 ERICK IgG Latest Ref Range: 0.0 - 0.9 AI 0.2   Dan ERICK Antibody IgG Latest Ref Range: 0.0 - 0.9 AI 0.2   SSA (Ro) (ERICK) Antibody, IgG Latest Ref Range: 0.0 - 0.9 AI 2.3 (H)   SSB (La) (ERICK) Antibody, IgG Latest Ref Range: 0.0 - 0.9 AI <0.2...     Results for YUE JAIMES (MRN " [id number]) as of 8/3/2017 09:18   Ref. Range 1/12/2017 10:08   Cardiolipin Antibody IgG Latest Ref Range: 0.0 - 19.9 GPL-U/mL <1.6...   Cardiolipin Antibody IgM Latest Ref Range: 0.0 - 19.9 MPL-U/mL 0.6   Results for YUE JAIMES (MRN [id number]) as of 8/3/2017 09:18   Ref. Range 1/12/2017 10:07   Lupus Result Latest Ref Range: NEG  Negative...     Reviewed Rheumatology lab flowsheet      I saw the patient with the fellow.  My exam and recomendations are as described.      Efrain Mckinney MD

## 2017-08-03 NOTE — NURSING NOTE
"Chief Complaint   Patient presents with     RECHECK     Follow up for Mixed connective tissue disease/SLE       Initial /77  Pulse 79  Ht 1.492 m (4' 10.75\")  Wt 58.4 kg (128 lb 12.8 oz)  SpO2 100%  BMI 26.24 kg/m2 Estimated body mass index is 26.24 kg/(m^2) as calculated from the following:    Height as of this encounter: 1.492 m (4' 10.75\").    Weight as of this encounter: 58.4 kg (128 lb 12.8 oz).  Medication Reconciliation: complete   Portia Martinez CMA    "

## 2017-08-16 ENCOUNTER — OFFICE VISIT (OUTPATIENT)
Dept: FAMILY MEDICINE | Facility: CLINIC | Age: 47
End: 2017-08-16

## 2017-08-16 VITALS
HEIGHT: 58 IN | HEART RATE: 72 BPM | BODY MASS INDEX: 27.54 KG/M2 | SYSTOLIC BLOOD PRESSURE: 124 MMHG | TEMPERATURE: 98.3 F | DIASTOLIC BLOOD PRESSURE: 74 MMHG | OXYGEN SATURATION: 100 % | RESPIRATION RATE: 18 BRPM | WEIGHT: 131.2 LBS

## 2017-08-16 DIAGNOSIS — Z00.00 ROUTINE GENERAL MEDICAL EXAMINATION AT A HEALTH CARE FACILITY: Primary | ICD-10-CM

## 2017-08-16 DIAGNOSIS — N92.6 IRREGULAR MENSTRUAL CYCLE: ICD-10-CM

## 2017-08-16 DIAGNOSIS — J30.89 OTHER ALLERGIC RHINITIS: ICD-10-CM

## 2017-08-16 LAB
CHOLEST SERPL-MCNC: 171 MG/DL (ref 0–200)
CHOLEST/HDLC SERPL: 4.6 {RATIO} (ref 0–5)
HDLC SERPL-MCNC: 37.5 MG/DL
LDLC SERPL CALC-MCNC: 112 MG/DL (ref 0–129)
TRIGL SERPL-MCNC: 108.4 MG/DL (ref 0–150)
TSH SERPL DL<=0.005 MIU/L-ACNC: 1.2 MU/L (ref 0.4–4)
VLDL CHOLESTEROL: 21.7 MG/DL (ref 7–32)

## 2017-08-16 RX ORDER — LORATADINE 10 MG/1
10 TABLET ORAL DAILY
Qty: 30 TABLET | Refills: 11 | Status: SHIPPED | OUTPATIENT
Start: 2017-08-16 | End: 2020-08-25

## 2017-08-16 NOTE — MR AVS SNAPSHOT
After Visit Summary   8/16/2017    Stephanie Reed    MRN: [id number]           Patient Information     Date Of Birth          1970        Visit Information        Provider Department      8/16/2017 8:20 AM Tori Severino MD [facility]        Today's Diagnoses     Routine general medical examination at a health care facility    -  1    Irregular menstrual cycle        Other allergic rhinitis          Care Instructions      Preventive Health Recommendations  Female Ages 40 to 49    Yearly exam:     See your health care provider every year in order to  1. Review health changes.   2. Discuss preventive care.    3. Review your medicines if your doctor prescribed any.      Get a Pap test every three years (unless you have an abnormal result and your provider advises testing more often).      If you get Pap tests with HPV test, you only need to test every 5 years, unless you have an abnormal result. You do not need a Pap test if your uterus was removed (hysterectomy) and you have not had cancer.      You should be tested each year for STDs (sexually transmitted diseases), if you're at risk.       Ask your doctor if you should have a mammogram.      Have a colonoscopy (test for colon cancer) if someone in your family has had colon cancer or polyps before age 50.       Have a cholesterol test every 5 years.       Have a diabetes test (fasting glucose) after age 45. If you are at risk for diabetes, you should have this test every 3 years.    Shots: Get a flu shot each year. Get a tetanus shot every 10 years.     Nutrition:     Eat at least 5 servings of fruits and vegetables each day.    Eat whole-grain bread, whole-wheat pasta and brown rice instead of white grains and rice.    Talk to your provider about Calcium and Vitamin D.     Lifestyle    Exercise at least 150 minutes a week (an average of 30 minutes a day, 5 days a week). This will help you control your weight and prevent  disease.    Limit alcohol to one drink per day.    No smoking.     Wear sunscreen to prevent skin cancer.    See your dentist every six months for an exam and cleaning.          Follow-ups after your visit        Your next 10 appointments already scheduled     Sep 19, 2017  9:45 AM CDT   RETURN GENERAL with Craig Logan MD   Eye Clinic ([facility])    [address]  [address]  [address]  [facility] [id number] [phone]            Feb 01, 2018  7:00 AM CST   (Arrive by 6:45 AM)   RETURN SCLERODERMA with Efrain Mckinney MD   [facility] ([facility])    [address]  3rd Floor  [facility] [address] [phone]              Who to contact     Please call your clinic at [phone] to:    Ask questions about your health    Make or cancel appointments    Discuss your medicines    Learn about your test results    Speak to your doctor   If you have compliments or concerns about an experience at your clinic, or if you wish to file a complaint, please contact [facility] Patient Relations at [phone] or email us at [email or web address]         Additional Information About Your Visit        [employer]i Information     [employer] gives you secure access to your electronic health record. If you see a primary care provider, you can also send messages to your care team and make appointments. If you have questions, please call your primary care clinic.  If you do not have a primary care provider, please call [phone] and they will assist you.      Sm[employer]ger is an electronic gateway that provides easy, online access to your medical records. With [employer], you can request a clinic appointment, read your test results, renew a prescription or communicate with your care team.     To access your existing account, please contact your [facility] or call [phone] for  "assistance.        Care EveryWhere ID     This is your Care EveryWhere ID. This could be used by other organizations to access your [facility] medical records  [id number]        Your Vitals Were     Pulse Temperature Respirations Height Last Period Pulse Oximetry    72 98.3  F (36.8  C) (Oral) 18 4' 10.25\" (148 cm) 04/16/2017 (Within Days) 100%    BMI (Body Mass Index)                   27.19 kg/m2            Blood Pressure from Last 3 Encounters:   08/16/17 124/74   08/03/17 118/77   01/12/17 121/80    Weight from Last 3 Encounters:   08/16/17 131 lb 3.2 oz (59.5 kg)   08/03/17 128 lb 12.8 oz (58.4 kg)   01/12/17 133 lb (60.3 kg)              We Performed the Following     HPV High Risk Types DNA Cervical     Lipid Panel (LabDAQ)     Pap imaged thin layer screen with HPV - recommended age 30 - 65 years (select HPV order below)     TSH with free T4 reflex          Today's Medication Changes          These changes are accurate as of: 8/16/17  9:01 AM.  If you have any questions, ask your nurse or doctor.               Start taking these medicines.        Dose/Directions    cholecalciferol 1000 UNIT tablet   Commonly known as:  vitamin D   Used for:  Routine general medical examination at a health care facility   Started by:  Tori Severino MD        Dose:  1000 Units   Take 1 tablet (1,000 Units) by mouth daily   Quantity:  100 tablet   Refills:  3         Stop taking these medicines if you haven't already. Please contact your care team if you have questions.     ferrous sulfate 325 (65 FE) MG tablet   Commonly known as:  IRON   Stopped by:  Tori Severino MD                Where to get your medicines      These medications were sent to [facility] [address] [place] [address] [address] [facility]  [address] [facility] [address] [facility] [address]     Phone:  [phone]     cholecalciferol 1000 UNIT tablet    loratadine 10 MG tablet                Primary Care Provider Office Phone # Fax #    Tori Severino, " [phone]       [address] [place] [id number]        Equal Access to Services     CARLITOS SQUIRES : Hadii aad ku hadvasylcesar Jenniferdavis, rajwinder megraffaeleha, billie sgkishore tapia, yogi randellin hayaan kellyashanti treadwell ladesinirmal mercedes. So [facility] [phone].    ATENCIÓN: Si habla español, tiene a miller disposición servicios gratuitos de asistencia lingüística. Llame al [phone].    We comply with applicable federal civil rights laws and [place] laws. We do not discriminate on the basis of race, color, national origin, age, disability sex, sexual orientation or gender identity.            Thank you!     Thank you for choosing [facility]  for your care. Our goal is always to provide you with excellent care. Hearing back from our patients is one way we can continue to improve our services. Please take a few minutes to complete the written survey that you may receive in the mail after your visit with us. Thank you!             Your Updated Medication List - Protect others around you: Learn how to safely use, store and throw away your medicines at www.disposemymeds.org.          This list is accurate as of: 8/16/17  9:01 AM.  Always use your most recent med list.                   Brand Name Dispense Instructions for use Diagnosis    artificial tears Soln      Instill one drop twice daily        cholecalciferol 1000 UNIT tablet    vitamin D    100 tablet    Take 1 tablet (1,000 Units) by mouth daily    Routine general medical examination at a health care facility       hydroxychloroquine 200 MG tablet    PLAQUENIL    30 tablet    Take 1 tablet (200 mg) by mouth daily (with dinner) Annual eye exams for monitoring.    Scleroderma (H), Mixed connective tissue disease (H), Other forms of systemic lupus erythematosus (H), Sicca syndrome (H), Vitamin D deficiency       loratadine 10 MG tablet    CLARITIN    30 tablet    Take 1 tablet (10 mg) by mouth daily    Other allergic rhinitis

## 2017-08-16 NOTE — PROGRESS NOTES
Female Physical Note          HPI         Concerns today: No special concerns today.  - March, LMP  - no spotting, bleeding, no pain/cramping  - prior to that, mainly monthly menses    Unsure when mom went through menopause    F/u with rheumatology last week - stable    Patient Active Problem List   Diagnosis     Scleroderma (H)     Seasonal allergic rhinitis     Mixed connective tissue disease (H)     Vitamin D deficiency     Systemic lupus erythematosus (H)     Sicca syndrome (H)     Raynaud phenomenon     Low back pain     Low back pain     Bilateral low back pain with left-sided sciatica     Atypical chest pain     ERNST (dyspnea on exertion)       Past Medical History:   Diagnosis Date     Carpal tunnel syndrome      Dry eye      Heartburn      Iron deficiency anemia      Long-term use of Plaquenil      Menorrhagia     with anemia     Nephrolithiasis         Family History   Problem Relation Age of Onset     Adopted: Yes     Unknown/Adopted Other      Glaucoma No family hx of      Macular Degeneration No family hx of               Review of Systems:     Review of Systems:  CONSTITUTIONAL: NEGATIVE for fever, chills, change in weight  INTEGUMENTARY/SKIN: NEGATIVE for worrisome rashes, moles or lesions  EYES: NEGATIVE for vision changes or irritation  ENT/MOUTH: NEGATIVE for ear, mouth and throat problems  RESP: NEGATIVE for significant cough or SOB  BREAST: NEGATIVE for masses, tenderness or discharge  CV: NEGATIVE for chest pain, palpitations or peripheral edema  GI: NEGATIVE for nausea, abdominal pain, heartburn, or change in bowel habits  : NEGATIVE for frequency, dysuria, or hematuria  MUSCULOSKELETAL: NEGATIVE for significant arthralgias or myalgia  NEURO: NEGATIVE for weakness, dizziness or paresthesias  ENDOCRINE: NEGATIVE for temperature intolerance, skin/hair changes  HEME/ALLERGY: NEGATIVE for bleeding problems  PSYCHIATRIC: NEGATIVE for changes in mood or affect  Sleep:   Do you snore most or the  "night (as reported by a family member)? No  Do you feel sleepy or extremely tired during most of the day? Yes, but not often            Social History     Social History     Social History     Marital status:      Spouse name: N/A     Number of children: N/A     Years of education: N/A     Occupational History     Not on file.     Social History Main Topics     Smoking status: Never Smoker     Smokeless tobacco: Never Used      Comment:  smokes outside only and not in the car      Alcohol use No     Drug use: No     Sexual activity: Yes     Partners: Male     Other Topics Concern     Not on file     Social History Narrative    Immigrant; From [place] in     Marital History - Currently ; lives with  and 3 kids.    Native Language Togolese    Occupation:;  at [employer].       Marital Status:  Who lives in your household? Self and family     Has anyone hurt you physically, for example by pushing, hitting, slapping or kicking you or forcing you to have sex? Denies  Do you feel threatened or controlled by a partner, ex-partner or anyone in your life? Denies    Sexual Health     Sexual concerns: No   STI History: Neg  Pregnancy History:   LMP No LMP recorded. has not had menstrual cycle for 4 months   Last Pap Smear Date:   Lab Results   Component Value Date    PAP NIL 04/10/2014    PAP NIL 2011     Abnormal Pap History: None    Recommended Screening     Cholesterol Level (>44 yo or at risk):  Recommended and patient accepted testing., Pap/HPV cotest every 5 years for women 30-65   Recommended and patient accepted testing. and HIV screening:  Recommended and patient accepted testing.  Breast CA Screening (>39 yo or 10 y before 1st degree relative diagnosis): will start at 50         Physical Exam:     Vitals: /74  Pulse 72  Temp 98.3  F (36.8  C) (Oral)  Resp 18  Ht 4' 10.25\" (148 cm)  Wt 131 lb 3.2 oz (59.5 kg)  LMP 2017 (Within Days)  SpO2 " 100%  BMI 27.19 kg/m2  BMI= Body mass index is 27.19 kg/(m^2).   GENERAL: healthy, alert and no distress  EYES: Eyes grossly normal to inspection, extraocular movements - intact, and PERRL  HENT: ear canals- normal; TMs- normal; Nose- normal; Mouth- no ulcers, no lesions  NECK: no tenderness, no adenopathy, no asymmetry, no masses, no stiffness; thyroid- normal to palpation  RESP: lungs clear to auscultation - no rales, no rhonchi, no wheezes  BREAST: no masses, no tenderness, no nipple discharge, no palpable axillary masses or adenopathy  CV: regular rates and rhythm, normal S1 S2, no S3 or S4 and no murmur, no click or rub -  ABDOMEN: soft, no tenderness, no  hepatosplenomegaly, no masses, normal bowel sounds  MS: extremities- no gross deformities noted, no edema  SKIN: no suspicious lesions, no rashes  NEURO: strength and tone- normal, sensory exam- grossly normal, mentation- intact, speech- normal, reflexes- symmetric  BACK: no CVA tenderness, no paralumbar tenderness  - female: cervix- normal; no discharge  PSYCH: Alert and oriented times 3; speech- coherent , normal rate and volume; able to articulate logical thoughts, able to abstract reason, no tangential thoughts, no hallucinations or delusions, affect- normal  LYMPHATICS: ant. cervical- normal, post. cervical- normal, axillary- normal      Assessment and Plan      Stephanie was seen today for physical.    Diagnoses and all orders for this visit:    Routine general medical examination at a health care facility  Possibly early menopause (will check TSH quickly today)  If no menses x 1 year, then is in menopause  Discussed options for hot flashes if this is a problem  Encourage Vit D/Calcium supplementation  Healthy diet/exercise  -     Pap imaged thin layer screen with HPV - recommended age 30 - 65 years (select HPV order below)  -     HPV High Risk Types DNA Cervical  -     Lipid Panel (LabDAQ)  -     cholecalciferol (VITAMIN D) 1000 UNIT tablet; Take 1  tablet (1,000 Units) by mouth daily    Irregular menstrual cycle  -     TSH with free T4 reflex    Other allergic rhinitis  -     loratadine (CLARITIN) 10 MG tablet; Take 1 tablet (10 mg) by mouth daily        Options for treatment and follow-up care were reviewed with the patient . Stephaniedominic Reed and/or guardian engaged in the decision making process and verbalized understanding of the options discussed and agreed with the final plan.    Tori Severino MD

## 2017-08-18 LAB
COPATH REPORT: NORMAL
PAP: NORMAL

## 2017-08-21 LAB
FINAL DIAGNOSIS: NORMAL
HPV HR 12 DNA CVX QL NAA+PROBE: NEGATIVE
HPV16 DNA SPEC QL NAA+PROBE: NEGATIVE
HPV18 DNA SPEC QL NAA+PROBE: NEGATIVE
SPECIMEN DESCRIPTION: NORMAL

## 2017-09-19 ENCOUNTER — OFFICE VISIT (OUTPATIENT)
Dept: OPHTHALMOLOGY | Facility: CLINIC | Age: 47
End: 2017-09-19
Attending: OPHTHALMOLOGY
Payer: COMMERCIAL

## 2017-09-19 DIAGNOSIS — Z79.899 LONG-TERM USE OF PLAQUENIL: Primary | ICD-10-CM

## 2017-09-19 DIAGNOSIS — H04.123 TEAR FILM INSUFFICIENCY, BILATERAL: Primary | ICD-10-CM

## 2017-09-19 DIAGNOSIS — Z79.899 LONG-TERM USE OF PLAQUENIL: ICD-10-CM

## 2017-09-19 PROCEDURE — 92083 EXTENDED VISUAL FIELD XM: CPT | Mod: ZF | Performed by: OPHTHALMOLOGY

## 2017-09-19 PROCEDURE — 99214 OFFICE O/P EST MOD 30 MIN: CPT | Mod: 25,ZF

## 2017-09-19 PROCEDURE — 92015 DETERMINE REFRACTIVE STATE: CPT | Mod: ZF

## 2017-09-19 PROCEDURE — 92134 CPTRZ OPH DX IMG PST SGM RTA: CPT | Mod: ZF | Performed by: OPHTHALMOLOGY

## 2017-09-19 ASSESSMENT — REFRACTION_WEARINGRX
OD_CYLINDER: +0.25
OD_ADD: +1.25
OS_SPHERE: -0.25
OD_SPHERE: -0.25
OS_ADD: +1.25
OD_AXIS: 55
OS_AXIS: 95
OS_CYLINDER: +0.50

## 2017-09-19 ASSESSMENT — REFRACTION_MANIFEST
OS_SPHERE: -0.25
OS_ADD: +2.50
OD_SPHERE: -0.25
OD_CYLINDER: +0.25
OS_AXIS: 095
OD_ADD: +2.50
OD_AXIS: 045
OS_CYLINDER: +0.75

## 2017-09-19 ASSESSMENT — SLIT LAMP EXAM - LIDS
COMMENTS: NORMAL
COMMENTS: NORMAL

## 2017-09-19 ASSESSMENT — EXTERNAL EXAM - LEFT EYE: OS_EXAM: NORMAL

## 2017-09-19 ASSESSMENT — TONOMETRY
OD_IOP_MMHG: 13
OS_IOP_MMHG: 19
IOP_METHOD: TONOPEN

## 2017-09-19 ASSESSMENT — CUP TO DISC RATIO
OS_RATIO: 0.6
OD_RATIO: 0.5

## 2017-09-19 ASSESSMENT — CONF VISUAL FIELD
METHOD: COUNTING FINGERS
OD_NORMAL: 1
OS_NORMAL: 1

## 2017-09-19 ASSESSMENT — VISUAL ACUITY
OD_CC: 20/20
OS_CC: 20/25
METHOD: SNELLEN - LINEAR
CORRECTION_TYPE: GLASSES
OS_CC+: -2

## 2017-09-19 ASSESSMENT — EXTERNAL EXAM - RIGHT EYE: OD_EXAM: NORMAL

## 2017-09-19 NOTE — NURSING NOTE
Chief Complaints and History of Present Illnesses   Patient presents with     Follow Up For     Yearly Plaquenil eye exam     HPI    Affected eye(s):  Both   Symptoms:     No floaters   No flashes   No redness   No Dryness         Do you have eye pain now?:  No      Comments:  Pt here for plaquenil eval. Pt taking Plaquenil 200mg daily for RA for 2-3 years.  Pt states vision is not as crisp as it used to be.    Polly STALLINGS September 19, 2017 10:02 AM

## 2017-09-19 NOTE — PROGRESS NOTES
General Ophthalmology Clinic Note:    CC: Plaquenil eye exam    HPI: Stephanie Reed is a 47 year old female who presents for annual plaquenil eye exam. She began taking plaquenil > 5 years. Left eye continues to have some irritation and strain.  Using artificial tears once per day    PMH:  Lupus, Plaquenil use for ~5yrs (200mg daily)    POHx:  Wears glasses     Current Eye Medications:   Artificial tears PRN      Assessment & Plan   Stephanie Reed is a 47 year old female with the following diagnoses:     1. Tear film insufficiency, bilateral    2. Long-term use of Plaquenil      Screening of ocular sequale with Plaquenil use  No sign of plaquenil toxicity.  Using plaquenil >5 years  Per AAO guidelines, we will continue to monitor with dilated fundus exam annually.    Will repeat 10-2 and OCT macula annually   Recommend increasing frequency of artificial tears to 4x per day to help with surface dryness.  Bedtime gel tears    RTC: 1 year for plaquenil exam or sooner if necessary    Attending Physician Attestation:  Complete documentation of historical and exam elements from today's encounter can be found in the full encounter summary report (not reduplicated in this progress note).  I personally obtained the chief complaint(s) and history of present illness.  I confirmed and edited as necessary the review of systems, past medical/surgical history, family history, social history, and examination findings as documented by others; and I examined the patient myself.  I personally reviewed the relevant tests, images, and reports as documented above.  I formulated and edited as necessary the assessment and plan and discussed the findings and management plan with the patient and family. Attending Physician Image/Tesing Attestation: I personally reviewed the ophthalmic test(s) associated with this encounter, agree with the interpretation(s) as documented by the resident/fellow, and have edited the corresponding report(s) as  necessary.  . - Craig Logan MD

## 2017-09-19 NOTE — MR AVS SNAPSHOT
After Visit Summary   9/19/2017    Stephanie Reed    MRN: [id number]           Patient Information     Date Of Birth          1970        Visit Information        Provider Department      9/19/2017 9:45 AM Craig Logan MD [facility]        Today's Diagnoses     Long-term use of Plaquenil           Follow-ups after your visit        Follow-up notes from your care team     Return in about 1 year (around 9/19/2018) for Plaquenil exam, MAC TOP, OCT Macula, FAF.      Your next 10 appointments already scheduled     Feb 01, 2018  7:00 AM CST   (Arrive by 6:45 AM)   RETURN SCLERODERMA with Efrain Mckinney MD   [facility] ([facility])    [address]  3rd [facility] [address] [phone]              Who to contact     Please call your clinic at [phone] to:    Ask questions about your health    Make or cancel appointments    Discuss your medicines    Learn about your test results    Speak to your doctor   If you have compliments or concerns about an experience at your clinic, or if you wish to file a complaint, please contact [facility] Patient Relations at [phone] or email us at [email or web address]         Additional Information About Your Visit        MyChart Information     [employer] gives you secure access to your electronic health record. If you see a primary care provider, you can also send messages to your care team and make appointments. If you have questions, please call your primary care clinic.  If you do not have a primary care provider, please call [phone] and they will assist you.      [employer] is an electronic gateway that provides easy, online access to your medical records. With [employer], you can request a clinic appointment, read your test results, renew a prescription or communicate with your care team.     To access your existing account, please contact your [facility]  [place] [facility] or call [phone] for assistance.        Care EveryWhere ID     This is your Care EveryWhere ID. This could be used by other organizations to access your [facility] medical records  [id number]         Blood Pressure from Last 3 Encounters:   08/16/17 124/74   08/03/17 118/77   01/12/17 121/80    Weight from Last 3 Encounters:   08/16/17 59.5 kg (131 lb 3.2 oz)   08/03/17 58.4 kg (128 lb 12.8 oz)   01/12/17 60.3 kg (133 lb)              We Performed the Following     Macula Top OU     OCT Retina Spectralis OU (both eyes)        Primary Care Provider Office Phone # Fax #    Tori Severino [phone]       [address] [address]        Equal Access to Services     CARLITOS SQUIRES : Hadii aad ku hadasho Sodavis, waaxda luqadaha, qaybta kaalmada adejered, yogi johnson . So [facility] [phone].    ATENCIÓN: Si habla español, tiene a miller disposición servicios gratuitos de asistencia lingüística. Amanda al [phone].    We comply with applicable federal civil rights laws and [place] laws. We do not discriminate on the basis of race, color, national origin, age, disability sex, sexual orientation or gender identity.            Thank you!     Thank you for choosing EYE CLINIC  for your care. Our goal is always to provide you with excellent care. Hearing back from our patients is one way we can continue to improve our services. Please take a few minutes to complete the written survey that you may receive in the mail after your visit with us. Thank you!             Your Updated Medication List - Protect others around you: Learn how to safely use, store and throw away your medicines at www.disposemymeds.org.          This list is accurate as of: 9/19/17 11:38 AM.  Always use your most recent med list.                   Brand Name Dispense Instructions for use Diagnosis    artificial tears Soln      Instill one drop twice  daily        cholecalciferol 1000 UNIT tablet    vitamin D    100 tablet    Take 1 tablet (1,000 Units) by mouth daily    Routine general medical examination at a health care facility       hydroxychloroquine 200 MG tablet    PLAQUENIL    30 tablet    Take 1 tablet (200 mg) by mouth daily (with dinner) Annual eye exams for monitoring.    Scleroderma (H), Mixed connective tissue disease (H), Other forms of systemic lupus erythematosus (H), Sicca syndrome (H), Vitamin D deficiency       loratadine 10 MG tablet    CLARITIN    30 tablet    Take 1 tablet (10 mg) by mouth daily    Other allergic rhinitis

## 2017-09-19 NOTE — LETTER
9/19/2017       RE: Stephanie Reed  [address]  [facility] [address]     Dear Colleague,    Thank you for referring your patient, Stephanie Reed, to the EYE CLINIC at [facility]. Please see a copy of my visit note below.    General Ophthalmology Clinic Note:    CC: Plaquenil eye exam    HPI: Stephanie Reed is a 47 year old female who presents for annual plaquenil eye exam. She began taking plaquenil > 5 years. Left eye continues to have some irritation and strain.  Using artificial tears once per day    PMH:  Lupus, Plaquenil use for ~5yrs (200mg daily)    POHx:  Wears glasses     Current Eye Medications:   Artificial tears PRN      Assessment & Plan   Stephanie Reed is a 47 year old female with the following diagnoses:     1. Tear film insufficiency, bilateral    2. Long-term use of Plaquenil      Screening of ocular sequale with Plaquenil use  No sign of plaquenil toxicity.  Using plaquenil >5 years  Per AAO guidelines, we will continue to monitor with dilated fundus exam annually.    Will repeat 10-2 and OCT macula annually   Recommend increasing frequency of artificial tears to 4x per day to help with surface dryness.  Bedtime gel tears    RTC: 1 year for plaquenil exam or sooner if necessary    Attending Physician Attestation:  Complete documentation of historical and exam elements from today's encounter can be found in the full encounter summary report (not reduplicated in this progress note).  I personally obtained the chief complaint(s) and history of present illness.  I confirmed and edited as necessary the review of systems, past medical/surgical history, family history, social history, and examination findings as documented by others; and I examined the patient myself.  I personally reviewed the relevant tests, images, and reports as documented above.  I formulated and edited as necessary the assessment and plan and discussed the findings and management plan with the  patient and family. Attending Physician Image/Tesing Attestation: I personally reviewed the ophthalmic test(s) associated with this encounter, agree with the interpretation(s) as documented by the resident/fellow, and have edited the corresponding report(s) as necessary.  . - Craig Logan MD       Again, thank you for allowing me to participate in the care of your patient.      Sincerely,    Craig Logan MD

## 2018-02-01 ENCOUNTER — OFFICE VISIT (OUTPATIENT)
Dept: RHEUMATOLOGY | Facility: CLINIC | Age: 48
End: 2018-02-01
Attending: INTERNAL MEDICINE
Payer: COMMERCIAL

## 2018-02-01 VITALS
TEMPERATURE: 98.3 F | BODY MASS INDEX: 27.21 KG/M2 | HEART RATE: 79 BPM | OXYGEN SATURATION: 98 % | HEIGHT: 58 IN | SYSTOLIC BLOOD PRESSURE: 122 MMHG | WEIGHT: 129.6 LBS | DIASTOLIC BLOOD PRESSURE: 76 MMHG

## 2018-02-01 DIAGNOSIS — I73.00 RAYNAUD'S PHENOMENON WITHOUT GANGRENE: Primary | ICD-10-CM

## 2018-02-01 DIAGNOSIS — M32.9 SYSTEMIC LUPUS ERYTHEMATOSUS, UNSPECIFIED SLE TYPE, UNSPECIFIED ORGAN INVOLVEMENT STATUS (H): ICD-10-CM

## 2018-02-01 DIAGNOSIS — M35.00 SICCA SYNDROME (H): ICD-10-CM

## 2018-02-01 DIAGNOSIS — M34.9 SCLERODERMA (H): ICD-10-CM

## 2018-02-01 DIAGNOSIS — M35.1 MIXED CONNECTIVE TISSUE DISEASE (H): ICD-10-CM

## 2018-02-01 PROCEDURE — G0463 HOSPITAL OUTPT CLINIC VISIT: HCPCS | Mod: ZF

## 2018-02-01 ASSESSMENT — PAIN SCALES - GENERAL: PAINLEVEL: MODERATE PAIN (5)

## 2018-02-01 NOTE — NURSING NOTE
"Chief Complaint   Patient presents with     RECHECK     scleroderma       Initial /76 (BP Location: Right arm, Patient Position: Sitting, Cuff Size: Adult Regular)  Pulse 79  Temp 98.3  F (36.8  C) (Oral)  Ht 1.48 m (4' 10.25\")  Wt 58.8 kg (129 lb 9.6 oz)  SpO2 98%  BMI 26.85 kg/m2 Estimated body mass index is 26.85 kg/(m^2) as calculated from the following:    Height as of this encounter: 1.48 m (4' 10.25\").    Weight as of this encounter: 58.8 kg (129 lb 9.6 oz).  Medication Reconciliation: complete     Mario Pinto MA    "

## 2018-02-01 NOTE — LETTER
2/1/2018      RE: Stephanie Reed  [address]  [facility] [address]       Rheumatology Visit     Stephanie Reed MRN# [id number]   YOB: 1970 Age: 43 year old       Primary care provider: Tori Severino          Assessment and Plan:   PROBLEM LIST:   1. Mixed connective tissue disease with high titer RNP positivity, low titer rheumatoid factor and clinically with predominantly scleroderma features with typical skin thickening and skin biopsy consistent with the diagnosis in 2006.   2. Good improvement of skin with phototherapy plus CellCept managed through Dr. Sarmiento.  3. Marked sicca symptoms improved with Evoxac.  4. GERD with normal gastric motility study, requiring no medications for control currently.  5. Cardiopulmonary symptoms with history of slightly elevated NT-proBNP currently normal and essentially normal echocardiogram in December of 2007 showing only trace TR, but with recent development of several-minute long symptoms of palpitations and difficulty breathing.   6. Intermittently painful stiff joints somewhat improved with ibuprofen.  7. History of carpal tunnel syndrome.   8. Vitamin D deficiency.   ASSESSMENT:      Overall Stephanie is essentially stable and has almost no residual sequelae from her MCTD. She is on Plaquenil therapy once daily and is up to date on her eye exams as of 9/19/17    She does describe some nighttime bilateral numbness and swelling localized to the swelling involving all of the fingers. This does not seem consistent with Raynaud's and although the distrubution is atypical, it seems most consistent with carpal tunnel syndrome. We discussed potential night time braces for carpal tunnel syndrome. Her Raynaud's is similar to previous with no unresolved attacks or ulceration. A calcium channel blocker and could be considered in the future if her symptoms become disabling during the winter months.    Will plan on seeing her back in 6 mo, sooner roman LORA, Velasquez Fowler MS4,  "served as a scribe in documentation for Dr. Mckinney.          History of Present Illness:   Stephanie Reed is a 43 year old female who presents for Followup of her MCTD with high-titer RNP antibodies, borderline rheumatoid factor, sclerodactyly, alopecia, joint symptoms, and low pulmonary volumes and DLCO on prior workup.     At this time, she feels as though she is \"back to normal\". She does note that 2 or 3 weeks ago her bilateral hands were intermittently swollen and generally numbn when she woke up at night. She localized this to numbness to all 5 of her fingers including her thumb but not extending past the hand.  This had happened previously when her disease was very active and is somewhat different that her previous carpal tunnel symptoms. Massaging these areas improved her symptoms. She is asymptomatic at this time.     Her breathing is stable and she recently traveled, walking frequently, and had no difficulty with this. Exercise tolerance is stable with no worsening SOB.     She continues to get some Raynaud's attacks with more severe cold weather, but denies any digital ulcers. The attacks do not bother her well being or her ability to work and they resolve, usually within 15-20 minutes, with warming lifestyle measures.     Her skin is largely normal now and she has been off CellCept for many years. She has not developed any new rashes or skin changes.    Sicca symptoms are including dry mouth are still present, but not worse in any way. Last time to see dentist was in December 2017 with no active cavities or oral lesions.     She is not having any difficulty swallowing solids or liquids or having either get stuck. Breathing is normal and unencumbered.         Review of Systems:   Skin: negative skin tightening, rash, or lesions  Eyes: negative vision changes or worsening dry eyes, red eyes.   Ears/Nose/Throat: negative oral lesions,   Respiratory: No shortness of breath, dyspnea on exertion, cough, or " "hemoptysis  Cardiovascular: negative for chest pain or palpitations  Gastrointestinal: Positive for GERD, negative for dysphagia or odonophagia   Musculoskeletal: Positive for bilateral hand swelling (resolved). Positive for arthralgias. Negative for myalgias.   Neurologic: Positive for bilateral hand numbness (resolved). Negative for focal weakness or tingling.   Hematologic/Lymphatic/Immunologic: negative for fatigue or weight loss.           Past Medical History:     Past Medical History:   Diagnosis Date     Carpal tunnel syndrome      Dry eye      Heartburn      Iron deficiency anemia      Long-term use of Plaquenil      Menorrhagia     with anemia     Nephrolithiasis      Patient Active Problem List    Diagnosis Date Noted     ERNST (dyspnea on exertion) 01/12/2017     Priority: Medium     Atypical chest pain 10/28/2016     Priority: Medium     Ordered Holter. Will try increasing PPI .        Scleroderma (H) 04/10/2014     Priority: Medium     LABS: MOISE >10. RNP positive. ds DNA negative.Jo1 negative. SSA negative. CK normal   CHEST CT: axillary adenopathy (2006)   ECHO: 12/04/07 EF 55%. RVSP normal Tr PG 14 mm Hg.   NM gastric emptying study: normal ;EGD 4/07 gastropathy   Skin biopsy (Jan 2007) c/w \"morphea.\" R forearm.        Seasonal allergic rhinitis 04/10/2014     Priority: Medium     Mixed connective tissue disease (H) 04/10/2014     Priority: Medium     Vitamin D deficiency 04/10/2014     Priority: Medium     Systemic lupus erythematosus (H) 04/10/2014     Priority: Medium     Sicca syndrome (H) 04/10/2014     Priority: Medium     Raynaud phenomenon 04/10/2014     Priority: Medium          Past Surgical History:     Past Surgical History:   Procedure Laterality Date     TUBAL LIGATION            Social History:     Social History   Substance Use Topics     Smoking status: Never Smoker     Smokeless tobacco: Never Used      Comment:  smokes outside only and not in the car      Alcohol use No        " "  Family History:     Family History   Problem Relation Age of Onset     Adopted: Yes     Unknown/Adopted Other      Glaucoma No family hx of      Macular Degeneration No family hx of           Allergies:   No Known Allergies          Medications:     Current Outpatient Prescriptions   Medication Sig Dispense Refill     loratadine (CLARITIN) 10 MG tablet Take 1 tablet (10 mg) by mouth daily 30 tablet 11     cholecalciferol (VITAMIN D) 1000 UNIT tablet Take 1 tablet (1,000 Units) by mouth daily 100 tablet 3     hydroxychloroquine (PLAQUENIL) 200 MG tablet Take 1 tablet (200 mg) by mouth daily (with dinner) Annual eye exams for monitoring. 30 tablet 3     artificial tears SOLN Instill one drop twice daily            Physical Exam:   Blood pressure 122/76, pulse 79, temperature 98.3  F (36.8  C), temperature source Oral, height 1.48 m (4' 10.25\"), weight 58.8 kg (129 lb 9.6 oz), SpO2 98 %, not currently breastfeeding.  Constitutional: WD-WN-WG cooperative  Eyes: nl EOM, PERRLA, vision, conjunctiva, sclera  ENT: nl external ears, nose, hearing, lips, teeth, gums, throat  No mucous membrane lesions, normal saliva pool  Neck: no mass or thyroid enlargement  Resp: lungs clear to auscultation, nl to palpation  CV: RRR, no murmurs, rubs or gallops, no edema  GI: no ABD mass or tenderness, no HSM  : not tested  Lymph: no cervical, supraclavicular, or epitrochlear nodes  MS: Slight MCP/PIP fullness, with trace tenderness. All other TMJ, neck, shoulder, elbow, wrist, MCP/PIP/DIP, spine, hip, knee, ankle, and foot MTP/IP joints were examined and  found normal. No active synovitis or deformity. Full ROM.  Normal  strength. No dactylitis,  tenosynovitis, enthespathy   Skin: Minimal sclerodactyly if any. Mild synovial hypertrophy MCP 2-3 bilaterally. No nail pitting, , rash, nodules or other lesions  Neuro: nl cranial nerves, strength, sensation, DTRs.   Psych: nl judgement, orientation, memory, affect.         Data: "     Office Visit on 08/16/2017   Component Date Value Ref Range Status     PAP 08/16/2017 NIL   Final     Copath Report 08/16/2017    Final                    Value:  Acc#: [id number]   Signed: 8/18/2017 10:26   MR#: [id number]    SPECIMEN/STAIN PROCESS:  Pap imaged thin layer prep screening (Surepath, FocalPoint with guided  screening)       Pap-Cyto x 1, HPV ordered x 1    SOURCE: Cervical, endocervical  ----------------------------------------------------------------   Pap imaged thin layer prep screening (Surepath, FocalPoint with guided  screening)  SPECIMEN ADEQUACY:  Satisfactory for evaluation.  -Transformation zone component present.    CYTOLOGIC INTERPRETATION:    Negative for intraepithelial lesion or malignancy    Electronically signed by:  ROSALBA Nielsen (ASCP)    CLINICAL HISTORY:  LMP: 4/16/2017  Previous normal pap  Date of Last Pap: 4/10/2014,    Papanicolaou Test Limitations:  Cervical cytology is a screening test  with limited sensitivity; regular screening is critical for cancer  prevention; Pap tests are primarily effective for the  diagnosis/prevention of squamous cell carcinoma, not adenocarcinomas or  other cancers.  TE                          STING LAB LOCATION:  [facility] [address] [address], [phone]  Processed and screened at [facility],  [facility]       HPV 16 DNA 08/16/2017 Negative  NEG^Negative Final     HPV 18 DNA 08/16/2017 Negative  NEG^Negative Final     Other HR HPV 08/16/2017 Negative  NEG^Negative Final     Final Diagnosis 08/16/2017 This patient's sample is negative for HPV DNA.   Final    Comment: (Note)  METHODOLOGY:  The Roche dominga 4800 system uses automated extraction,   simultaneous amplification of HPV (L1 region) and beta-globin,    followed by  real time detection of fluorescent labeled HPV and beta   globin using specific oligonucleotide  probes . The test specifically   identifies types HPV 16 DNA and HPV 18 DNA while concurrently   detecting the rest of the high risk types (31, 33, 35, 39, 45, 51,   52, 56, 58, 59, 66 or 68).  COMMENTS:  This test is not intended for use as a screening device   for women under age 30 with normal cervical cytology.  Results should   be correlated with cytologic and histologic findings. Close clinical   followup is recommended.  This test was developed and its performance characteristics   determined by the [facility], Molecular   Diagnostics Laboratory. It has not been cleared or approved by the   FDA. The laboratory is regulated under CLIA as qualified to perform   high-complexity testing. This test is used                            for clinical purposes. It   should not be regarded as investigational or for research.       Specimen Description 08/16/2017 Cervical Cells   Final    c17 [id number]     Cholesterol 08/16/2017 171.0  0.0 - 200.0 mg/dL Final     Cholesterol/HDL Ratio 08/16/2017 4.6  0.0 - 5.0 Final     HDL Cholesterol 08/16/2017 37.5* >40.0 mg/dL Final     LDL Cholesterol Calculated 08/16/2017 112  0 - 129 mg/dL Final     Triglycerides 08/16/2017 108.4  0.0 - 150.0 mg/dL Final     VLDL Cholesterol 08/16/2017 21.7  7.0 - 32.0 mg/dL Final     TSH 08/16/2017 1.20  0.40 - 4.00 mU/L Final         Reviewed Rheumatology lab flowsheet    I saw the patient with the student, who served as my scribe, recording my history,  exam and recommendations as described.      Efrain Mckinney MD

## 2018-02-01 NOTE — PROGRESS NOTES
Rheumatology Visit     Stephanie Reed MRN# [id number]   YOB: 1970 Age: 43 year old       Primary care provider: Tori Severino          Assessment and Plan:   PROBLEM LIST:   1. Mixed connective tissue disease with high titer RNP positivity, low titer rheumatoid factor and clinically with predominantly scleroderma features with typical skin thickening and skin biopsy consistent with the diagnosis in 2006.   2. Good improvement of skin with phototherapy plus CellCept managed through Dr. Sarmiento.  3. Marked sicca symptoms improved with Evoxac.  4. GERD with normal gastric motility study, requiring no medications for control currently.  5. Cardiopulmonary symptoms with history of slightly elevated NT-proBNP currently normal and essentially normal echocardiogram in December of 2007 showing only trace TR, but with recent development of several-minute long symptoms of palpitations and difficulty breathing.   6. Intermittently painful stiff joints somewhat improved with ibuprofen.  7. History of carpal tunnel syndrome.   8. Vitamin D deficiency.   ASSESSMENT:      Overall Stephanie is essentially stable and has almost no residual sequelae from her MCTD. She is on Plaquenil therapy once daily and is up to date on her eye exams as of 9/19/17    She does describe some nighttime bilateral numbness and swelling localized to the swelling involving all of the fingers. This does not seem consistent with Raynaud's and although the distrubution is atypical, it seems most consistent with carpal tunnel syndrome. We discussed potential night time braces for carpal tunnel syndrome. Her Raynaud's is similar to previous with no unresolved attacks or ulceration. A calcium channel blocker and could be considered in the future if her symptoms become disabling during the winter months.    Will plan on seeing her back in 6 mo, sooner roman LORA, Velasquez Fowler MS4, served as a scribe in documentation for Dr. Mckinney.          History of  "Present Illness:   Stephanie Reed is a 43 year old female who presents for Followup of her MCTD with high-titer RNP antibodies, borderline rheumatoid factor, sclerodactyly, alopecia, joint symptoms, and low pulmonary volumes and DLCO on prior workup.     At this time, she feels as though she is \"back to normal\". She does note that 2 or 3 weeks ago her bilateral hands were intermittently swollen and generally numbn when she woke up at night. She localized this to numbness to all 5 of her fingers including her thumb but not extending past the hand.  This had happened previously when her disease was very active and is somewhat different that her previous carpal tunnel symptoms. Massaging these areas improved her symptoms. She is asymptomatic at this time.     Her breathing is stable and she recently traveled, walking frequently, and had no difficulty with this. Exercise tolerance is stable with no worsening SOB.     She continues to get some Raynaud's attacks with more severe cold weather, but denies any digital ulcers. The attacks do not bother her well being or her ability to work and they resolve, usually within 15-20 minutes, with warming lifestyle measures.     Her skin is largely normal now and she has been off CellCept for many years. She has not developed any new rashes or skin changes.    Sicca symptoms are including dry mouth are still present, but not worse in any way. Last time to see dentist was in December 2017 with no active cavities or oral lesions.     She is not having any difficulty swallowing solids or liquids or having either get stuck. Breathing is normal and unencumbered.         Review of Systems:   Skin: negative skin tightening, rash, or lesions  Eyes: negative vision changes or worsening dry eyes, red eyes.   Ears/Nose/Throat: negative oral lesions,   Respiratory: No shortness of breath, dyspnea on exertion, cough, or hemoptysis  Cardiovascular: negative for chest pain or " "palpitations  Gastrointestinal: Positive for GERD, negative for dysphagia or odonophagia   Musculoskeletal: Positive for bilateral hand swelling (resolved). Positive for arthralgias. Negative for myalgias.   Neurologic: Positive for bilateral hand numbness (resolved). Negative for focal weakness or tingling.   Hematologic/Lymphatic/Immunologic: negative for fatigue or weight loss.           Past Medical History:     Past Medical History:   Diagnosis Date     Carpal tunnel syndrome      Dry eye      Heartburn      Iron deficiency anemia      Long-term use of Plaquenil      Menorrhagia     with anemia     Nephrolithiasis      Patient Active Problem List    Diagnosis Date Noted     ERNST (dyspnea on exertion) 01/12/2017     Priority: Medium     Atypical chest pain 10/28/2016     Priority: Medium     Ordered Holter. Will try increasing PPI .        Scleroderma (H) 04/10/2014     Priority: Medium     LABS: MOISE >10. RNP positive. ds DNA negative.Jo1 negative. SSA negative. CK normal   CHEST CT: axillary adenopathy (2006)   ECHO: 12/04/07 EF 55%. RVSP normal Tr PG 14 mm Hg.   NM gastric emptying study: normal ;EGD 4/07 gastropathy   Skin biopsy (Jan 2007) c/w \"morphea.\" R forearm.        Seasonal allergic rhinitis 04/10/2014     Priority: Medium     Mixed connective tissue disease (H) 04/10/2014     Priority: Medium     Vitamin D deficiency 04/10/2014     Priority: Medium     Systemic lupus erythematosus (H) 04/10/2014     Priority: Medium     Sicca syndrome (H) 04/10/2014     Priority: Medium     Raynaud phenomenon 04/10/2014     Priority: Medium          Past Surgical History:     Past Surgical History:   Procedure Laterality Date     TUBAL LIGATION            Social History:     Social History   Substance Use Topics     Smoking status: Never Smoker     Smokeless tobacco: Never Used      Comment:  smokes outside only and not in the car      Alcohol use No          Family History:     Family History   Problem " "Relation Age of Onset     Adopted: Yes     Unknown/Adopted Other      Glaucoma No family hx of      Macular Degeneration No family hx of           Allergies:   No Known Allergies          Medications:     Current Outpatient Prescriptions   Medication Sig Dispense Refill     loratadine (CLARITIN) 10 MG tablet Take 1 tablet (10 mg) by mouth daily 30 tablet 11     cholecalciferol (VITAMIN D) 1000 UNIT tablet Take 1 tablet (1,000 Units) by mouth daily 100 tablet 3     hydroxychloroquine (PLAQUENIL) 200 MG tablet Take 1 tablet (200 mg) by mouth daily (with dinner) Annual eye exams for monitoring. 30 tablet 3     artificial tears SOLN Instill one drop twice daily            Physical Exam:   Blood pressure 122/76, pulse 79, temperature 98.3  F (36.8  C), temperature source Oral, height 1.48 m (4' 10.25\"), weight 58.8 kg (129 lb 9.6 oz), SpO2 98 %, not currently breastfeeding.  Constitutional: WD-WN-WG cooperative  Eyes: nl EOM, PERRLA, vision, conjunctiva, sclera  ENT: nl external ears, nose, hearing, lips, teeth, gums, throat  No mucous membrane lesions, normal saliva pool  Neck: no mass or thyroid enlargement  Resp: lungs clear to auscultation, nl to palpation  CV: RRR, no murmurs, rubs or gallops, no edema  GI: no ABD mass or tenderness, no HSM  : not tested  Lymph: no cervical, supraclavicular, or epitrochlear nodes  MS: Slight MCP/PIP fullness, with trace tenderness. All other TMJ, neck, shoulder, elbow, wrist, MCP/PIP/DIP, spine, hip, knee, ankle, and foot MTP/IP joints were examined and  found normal. No active synovitis or deformity. Full ROM.  Normal  strength. No dactylitis,  tenosynovitis, enthespathy   Skin: Minimal sclerodactyly if any. Mild synovial hypertrophy MCP 2-3 bilaterally. No nail pitting, , rash, nodules or other lesions  Neuro: nl cranial nerves, strength, sensation, DTRs.   Psych: nl judgement, orientation, memory, affect.         Data:     Office Visit on 08/16/2017   Component Date Value " Ref Range Status     PAP 08/16/2017 NIL   Final     Copath Report 08/16/2017    Final                    Value:  Acc#: [id number]   Signed: 8/18/2017 10:26   MR#: [id number]    SPECIMEN/STAIN PROCESS:  Pap imaged thin layer prep screening (Surepath, FocalPoint with guided  screening)       Pap-Cyto x 1, HPV ordered x 1    SOURCE: Cervical, endocervical  ----------------------------------------------------------------   Pap imaged thin layer prep screening (Surepath, FocalPoint with guided  screening)  SPECIMEN ADEQUACY:  Satisfactory for evaluation.  -Transformation zone component present.    CYTOLOGIC INTERPRETATION:    Negative for intraepithelial lesion or malignancy    Electronically signed by:  ROSALBA Nielsen (ASCP)    CLINICAL HISTORY:  LMP: 4/16/2017  Previous normal pap  Date of Last Pap: 4/10/2014,    Papanicolaou Test Limitations:  Cervical cytology is a screening test  with limited sensitivity; regular screening is critical for cancer  prevention; Pap tests are primarily effective for the  diagnosis/prevention of squamous cell carcinoma, not adenocarcinomas or  other cancers.  TE                          STING LAB LOCATION:  [facility] [address] [address], [phone]  Processed and screened at [facility],  [facility]       HPV 16 DNA 08/16/2017 Negative  NEG^Negative Final     HPV 18 DNA 08/16/2017 Negative  NEG^Negative Final     Other HR HPV 08/16/2017 Negative  NEG^Negative Final     Final Diagnosis 08/16/2017 This patient's sample is negative for HPV DNA.   Final    Comment: (Note)  METHODOLOGY:  The Roche dominga 4800 system uses automated extraction,   simultaneous amplification of HPV (L1 region) and beta-globin,    followed by  real time detection of fluorescent labeled HPV and beta   globin using specific oligonucleotide probes . The test specifically   identifies types HPV 16  DNA and HPV 18 DNA while concurrently   detecting the rest of the high risk types (31, 33, 35, 39, 45, 51,   52, 56, 58, 59, 66 or 68).  COMMENTS:  This test is not intended for use as a screening device   for women under age 30 with normal cervical cytology.  Results should   be correlated with cytologic and histologic findings. Close clinical   followup is recommended.  This test was developed and its performance characteristics   determined by the [facility], Molecular   Diagnostics Laboratory. It has not been cleared or approved by the   FDA. The laboratory is regulated under CLIA as qualified to perform   high-complexity testing. This test is used                            for clinical purposes. It   should not be regarded as investigational or for research.       Specimen Description 08/16/2017 Cervical Cells   Final    c17 [id number]     Cholesterol 08/16/2017 171.0  0.0 - 200.0 mg/dL Final     Cholesterol/HDL Ratio 08/16/2017 4.6  0.0 - 5.0 Final     HDL Cholesterol 08/16/2017 37.5* >40.0 mg/dL Final     LDL Cholesterol Calculated 08/16/2017 112  0 - 129 mg/dL Final     Triglycerides 08/16/2017 108.4  0.0 - 150.0 mg/dL Final     VLDL Cholesterol 08/16/2017 21.7  7.0 - 32.0 mg/dL Final     TSH 08/16/2017 1.20  0.40 - 4.00 mU/L Final         Reviewed Rheumatology lab flowsheet    I saw the patient with the student, who served as my scribe, recording my history,  exam and recommendations as described.      Efrain Mckinney MD

## 2018-02-01 NOTE — MR AVS SNAPSHOT
After Visit Summary   2/1/2018    Stephanie Reed    MRN: [id number]           Patient Information     Date Of Birth          1970        Visit Information        Provider Department      2/1/2018 7:00 AM Efrain Mckinney MD [facility]         Follow-ups after your visit        Your next 10 appointments already scheduled     Aug 02, 2018  7:00 AM CDT   (Arrive by 6:45 AM)   RETURN SCLERODERMA with Efrain Mckinney MD   [facility] ([facility])    [address]  [address]  [facility] [address] [phone]            Sep 20, 2018 10:15 AM CDT   RETURN GENERAL with Craig Logan MD   Eye Clinic ([facility])    Jarred Mccauley [facility]  [address]  [address] [address]  [facility] [address] [phone]              Who to contact     If you have questions or need follow up information about today's clinic visit or your schedule please contact [facility] RHEUMATOLOGY directly at [phone].  Normal or non-critical lab and imaging results will be communicated to you by Twillionhart, letter or phone within 4 business days after the clinic has received the results. If you do not hear from us within 7 days, please contact the clinic through Fontactot or phone. If you have a critical or abnormal lab result, we will notify you by phone as soon as possible.  Submit refill requests through [employer] or call your pharmacy and they will forward the refill request to us. Please allow 3 business days for your refill to be completed.          Additional Information About Your Visit        Twillionhart Information     [employer] gives you secure access to your electronic health record. If you see a primary care provider, you can also send messages to your care team and make appointments. If you have questions, please call your primary care clinic.  If you do not have a primary care provider, please call [phone] and they will assist you.        Care  "EveryWhere ID     This is your Care EveryWhere ID. This could be used by other organizations to access your [place] medical records  [id number]        Your Vitals Were     Pulse Temperature Height Pulse Oximetry BMI (Body Mass Index)       79 98.3  F (36.8  C) (Oral) 1.48 m (4' 10.25\") 98% 26.85 kg/m2        Blood Pressure from Last 3 Encounters:   02/01/18 122/76   08/16/17 124/74   08/03/17 118/77    Weight from Last 3 Encounters:   02/01/18 58.8 kg (129 lb 9.6 oz)   08/16/17 59.5 kg (131 lb 3.2 oz)   08/03/17 58.4 kg (128 lb 12.8 oz)              Today, you had the following     No orders found for display       Primary Care Provider Office Phone # Fax #    Tori Severino [phone]       [address] [address]        Equal Access to Services     CARLITOS [facility] : Hadii aad ku hadasho Soomaali, waaxda luqadaha, qaybta kaalmada adeegyada, waxay randellin karla johnson . So [facility] [phone].    ATENCIÓN: Si habla español, tiene a miller disposición servicios gratuitos de asistencia lingüística. Amanda al [phone].    We comply with applicable federal civil rights laws and [place] laws. We do not discriminate on the basis of race, color, national origin, age, disability, sex, sexual orientation, or gender identity.            Thank you!     Thank you for choosing [facility] RHEUMATOLOGY  for your care. Our goal is always to provide you with excellent care. Hearing back from our patients is one way we can continue to improve our services. Please take a few minutes to complete the written survey that you may receive in the mail after your visit with us. Thank you!             Your Updated Medication List - Protect others around you: Learn how to safely use, store and throw away your medicines at www.disposemymeds.org.          This list is accurate as of 2/1/18  7:44 AM.  Always use your most recent med list.                   Brand Name Dispense Instructions " for use Diagnosis    artificial tears Soln      Instill one drop twice daily        cholecalciferol 1000 UNIT tablet    vitamin D3    100 tablet    Take 1 tablet (1,000 Units) by mouth daily    Routine general medical examination at a health care facility       hydroxychloroquine 200 MG tablet    PLAQUENIL    30 tablet    Take 1 tablet (200 mg) by mouth daily (with dinner) Annual eye exams for monitoring.    Scleroderma (H), Mixed connective tissue disease (H), Other forms of systemic lupus erythematosus (H), Sicca syndrome (H), Vitamin D deficiency       loratadine 10 MG tablet    CLARITIN    30 tablet    Take 1 tablet (10 mg) by mouth daily    Other allergic rhinitis

## 2018-02-16 PROBLEM — I73.00 RAYNAUD'S PHENOMENON WITHOUT GANGRENE: Status: ACTIVE | Noted: 2018-02-16

## 2018-08-02 ENCOUNTER — OFFICE VISIT (OUTPATIENT)
Dept: RHEUMATOLOGY | Facility: CLINIC | Age: 48
End: 2018-08-02
Attending: INTERNAL MEDICINE
Payer: COMMERCIAL

## 2018-08-02 VITALS
BODY MASS INDEX: 27.12 KG/M2 | DIASTOLIC BLOOD PRESSURE: 80 MMHG | HEART RATE: 78 BPM | HEIGHT: 59 IN | SYSTOLIC BLOOD PRESSURE: 119 MMHG | WEIGHT: 134.5 LBS | TEMPERATURE: 98.1 F

## 2018-08-02 DIAGNOSIS — Z79.899 LONG-TERM USE OF PLAQUENIL: Primary | ICD-10-CM

## 2018-08-02 DIAGNOSIS — M35.00 SICCA SYNDROME (H): ICD-10-CM

## 2018-08-02 DIAGNOSIS — M34.9 SCLERODERMA (H): ICD-10-CM

## 2018-08-02 DIAGNOSIS — M35.1 MIXED CONNECTIVE TISSUE DISEASE (H): ICD-10-CM

## 2018-08-02 DIAGNOSIS — M15.3 OTHER SECONDARY OSTEOARTHRITIS OF MULTIPLE SITES: ICD-10-CM

## 2018-08-02 DIAGNOSIS — E55.9 VITAMIN D DEFICIENCY: ICD-10-CM

## 2018-08-02 DIAGNOSIS — Z51.81 MEDICATION MONITORING ENCOUNTER: ICD-10-CM

## 2018-08-02 PROCEDURE — G0463 HOSPITAL OUTPT CLINIC VISIT: HCPCS | Mod: ZF

## 2018-08-02 RX ORDER — HYDROXYCHLOROQUINE SULFATE 200 MG/1
200 TABLET, FILM COATED ORAL
Qty: 90 TABLET | Refills: 3 | Status: SHIPPED | OUTPATIENT
Start: 2018-08-02 | End: 2020-08-25

## 2018-08-02 ASSESSMENT — PAIN SCALES - GENERAL: PAINLEVEL: MILD PAIN (3)

## 2018-08-02 NOTE — MR AVS SNAPSHOT
After Visit Summary   8/2/2018    Stephanie Reed    MRN: [id number]           Patient Information     Date Of Birth          1970        Visit Information        Provider Department      8/2/2018 7:00 AM Efrain Mckinney MD [facility]        Today's Diagnoses     Long-term use of Plaquenil    -  1    Scleroderma (H)        Mixed connective tissue disease (H)        Sicca syndrome (H)        Vitamin D deficiency        Other secondary osteoarthritis of multiple sites           Follow-ups after your visit        Your next 10 appointments already scheduled     Sep 20, 2018 10:15 AM CDT   RETURN GENERAL with Craig Logan MD   Eye Clinic ([facility])    [address]  [address] [address]  [facility] [address]   [phone]            Jan 30, 2019  8:00 AM CST   Ech Complete with [id number]   [facility] Echo ([facility])    [address] [place] [id number] [phone]           1.  Please bring or wear a comfortable two-piece outfit. 2.  You may eat, drink and take your normal medicines. 3.  For any questions that cannot be answered, please contact the ordering physician 4.  Please do not wear perfumes or scented lotions on the day of your exam.            Jan 30, 2019  9:00 AM CST   FULL PULMONARY FUNCTION with UC PFL A   [facility] Pulmonary Function Testing ([facility])    [address] [place] [address]   [phone]            Jan 30, 2019 10:00 AM CST   Six Minute Walk with UC PFL 6 MINUTE WALK 1   [facility] Pulmonary Function Testing ([facility])    [address] [place] [address]   [phone]            Jan 31, 2019  7:30 AM CST   (Arrive by 7:15 AM)   RETURN SCLERODERMA with Efrain Mckinney MD   [facility] ([facility])    [address] [place] [address]  [address]  [facility] [address] [phone]              Future tests that were ordered for you today     Open Future Orders        Priority Expected Expires Ordered    General PFT Lab (Please always keep checked) Routine  8/2/2019 8/2/2018    6 minute walk test Routine  8/2/2019 8/2/2018    Pulmonary Function Test Routine  8/2/2019 8/2/2018    Echocardiogram Routine  8/2/2019 8/2/2018            Who to contact     If you have questions or need follow up information about today's clinic visit or your schedule please contact [facility] RHEUMATOLOGY directly at [phone].  Normal or non-critical lab and imaging results will be communicated to you by [employer], letter or phone within 4 business days after the clinic has received the results. If you do not hear from us within 7 days, please contact the clinic through [email or web address] or phone. If you have a critical or abnormal lab result, we will notify you by phone as soon as possible.  Submit refill requests through [employer] or call your pharmacy and they will forward the refill request to us. Please allow 3 business days for your refill to be completed.          Additional Information About Your Visit        [employer]ivers Information     [employer] gives you secure access to your electronic health record. If you see a primary care provider, you can also send messages to your care team and make appointments. If you have questions, please call your primary care clinic.  If you do not have a primary care provider, please call [phone] and they will assist you.        Care EveryWhere ID     This is your Care EveryWhere ID. This could be used by other organizations to access your [place] medical records  [id number]        Your Vitals Were     Pulse Temperature Height BMI (Body Mass Index)          78 98.1  F (36.7  C) (Oral) 1.499 m (4' 11\") 27.17 kg/m2         Blood Pressure from Last 3 Encounters:   08/02/18 119/80   02/01/18 122/76   08/16/17 124/74    Weight from Last 3 " Encounters:   08/02/18 61 kg (134 lb 8 oz)   02/01/18 58.8 kg (129 lb 9.6 oz)   08/16/17 59.5 kg (131 lb 3.2 oz)                 Today's Medication Changes          These changes are accurate as of 8/2/18  7:52 AM.  If you have any questions, ask your nurse or doctor.               Start taking these medicines.        Dose/Directions    diclofenac 1 % Gel topical gel   Commonly known as:  VOLTAREN   Used for:  Scleroderma (H), Mixed connective tissue disease (H), Vitamin D deficiency, Other secondary osteoarthritis of multiple sites   Started by:  Efrain Mckinney MD        Dose:  2 g   Apply 2 g topically 4 times daily as needed for moderate pain   Quantity:  1 Tube   Refills:  11            Where to get your medicines      These medications were sent to [facility] [address] [place] [address] [address] [facility]  [address] [facility] [place] [address]     Phone:  [phone]     diclofenac 1 % Gel topical gel    hydroxychloroquine 200 MG tablet                Primary Care Provider Office Phone # Fax #    Tori Severino [phone]       [address] [address]        Equal Access to Services     CARLITOS SQUIRES AH: Hadii chante keating hadvasylo Sodavis, waaxda luqadaha, qaybta kaalmada adeegyada, yogi long. So [facility] [phone].    ATENCIÓN: Si habla español, tiene a miller disposición servicios gratuitos de asistencia lingüística. [facility] [phone].    We comply with applicable federal civil rights laws and [place] laws. We do not discriminate on the basis of race, color, national origin, age, disability, sex, sexual orientation, or gender identity.            Thank you!     Thank you for choosing [facility] RHEUMATOLOGY  for your care. Our goal is always to provide you with excellent care. Hearing back from our patients is one way we can continue to improve our services. Please take a few minutes to complete the written survey that you  may receive in the mail after your visit with us. Thank you!             Your Updated Medication List - Protect others around you: Learn how to safely use, store and throw away your medicines at www.disposemymeds.org.          This list is accurate as of 8/2/18  7:52 AM.  Always use your most recent med list.                   Brand Name Dispense Instructions for use Diagnosis    artificial tears Soln      Instill one drop twice daily        cholecalciferol 1000 UNIT tablet    vitamin D3    100 tablet    Take 1 tablet (1,000 Units) by mouth daily    Routine general medical examination at a health care facility       diclofenac 1 % Gel topical gel    VOLTAREN    1 Tube    Apply 2 g topically 4 times daily as needed for moderate pain    Scleroderma (H), Mixed connective tissue disease (H), Vitamin D deficiency, Other secondary osteoarthritis of multiple sites       hydroxychloroquine 200 MG tablet    PLAQUENIL    90 tablet    Take 1 tablet (200 mg) by mouth daily (with dinner) Annual eye exams for monitoring.    Scleroderma (H), Mixed connective tissue disease (H), Sicca syndrome (H), Long-term use of Plaquenil       loratadine 10 MG tablet    CLARITIN    30 tablet    Take 1 tablet (10 mg) by mouth daily    Other allergic rhinitis

## 2018-08-02 NOTE — PROGRESS NOTES
"[facility]  Rheumatology Clinic  Efrain Mckinney MD  2018     Name: Stephanie Reed  MRN: [id number]  Age: 48 year old  : 1970  Referring provider: Tori Severino     Assessment and Plan:  - Scleroderma (H)  - Mixed connective tissue disease (H)    We will plan to repeat pulmonary function testing and echocardiogram in 6 months prior to our follow up, for routine pulmonary screening despite her lack of symptoms.   - Long-term use of Plaquenil  The patient is doing well overall and is stable on plaquenil but did report some new DIP/PIP swelling and pain. We discussed continuing plaquenil. She has had no eye toxicity.     - Sicca syndrome (H)  We discussed risks, benefits, and side effects of using evoxac for her eye and mouth dryness, which we can consider in the future if her symptoms become more bothersome.     - Vitamin D deficiency  - Other secondary osteoarthritis of multiple sites  She can try using Voltaren gel for her hand pain, which may be helpful when used about 15 minutes prior to activities like cooking. Continue avoiding ibuprofen.     She did note some very intermittent leg cramping, and we discussed increasing calcium and potassium in her diet. If this becomes more frequent or bothersome, she will reach out to us.     - hydroxychloroquine (PLAQUENIL) 200 MG tablet  Dispense: 90 tablet; Refill: 3  - diclofenac (VOLTAREN) 1 % GEL topical gel  Dispense: 1 Tube; Refill: 11    Follow-up: 6 months     HPI:   Stephanie Reed is a 48 year old female who presents for followup of her MCTD with high-titer RNP antibodies, borderline rheumatoid factor, sclerodactyly, alopecia, joint symptoms, and low pulmonary volumes and DLCO on prior workups.     I last evaluated the patient on 2018; please see this note for details. At that time she felt \"back to normal\", although did note intermittent bilateral hand swelling and generalized numbness on waking up at night 2-3 weeks prior to our evaluation, which " had resolved. She continued to get Raynaud's attacks with severe cold weather but had no digital ulcers and did not find these particularly bothersome. Her Sicca symptoms were still present but not worsening. She additionally denied shortness of breath, rashes, other skin changes, or difficulty swallowing. The patient was stable on plaquenil therapy once daily.     Today, the patient continues to do well on plaquenil. She reports that about a month ago she developed pain and some swelling to her PIP and DIP joints, worst to the left middle finger, which is new for her. The swelling has improved, but she continues to have pain to the fingers. She denies any pain or swelling to the MCPs.     She continues to have dryness to her eyes and mouth and has been using artificial tears. She follows with a dentist and denies any cavities. She additionally denies difficulty swallowing or eating foods like bread and pasta, although she reports that sometimes food will come back up after eating. Her breathing is stable and she denies shortness of breath. She is able to climb a flight of stairs without developing dyspnea. She continued to have color changes in her hands in the cold, but this is improved compared to previously and she can get her fingertips to turn pink in 15-20 minutes and did not develop digital ulcers. Of note, she reports very intermittent left calf cramping, which last occurred yesterday evening. She denies any other complaints.     She has not used ibuprofen for her symptoms. Her last pulmonary function testing and echocardiogram were in 2015.     Review of Systems:   Pertinent items are noted in HPI, remainder of complete ROS is negative.    No recent problems with hearing or vision. No swallowing problems.   No breathing difficulty, shortness of breath, coughing, or wheezing  No chest pain or palpitations  No heart burn, abdominal pain, nausea, vomiting, diarrhea  No urination problems, no bloody, cloudy  "urine, no dysuria  No numbing, tingling, weakness  No headaches or confusion  No rashes. No easy bleeding or bruising.  + calf cramping      Active Medications:      artificial tears SOLN, Instill one drop twice daily, Disp: , Rfl:      cholecalciferol (VITAMIN D) 1000 UNIT tablet, Take 1 tablet (1,000 Units) by mouth daily, Disp: 100 tablet, Rfl: 3     hydroxychloroquine (PLAQUENIL) 200 MG tablet, Take 1 tablet (200 mg) by mouth daily (with dinner) Annual eye exams for monitoring., Disp: 30 tablet, Rfl: 3     loratadine (CLARITIN) 10 MG tablet, Take 1 tablet (10 mg) by mouth daily, Disp: 30 tablet, Rfl: 11      Allergies:   The patient reports no known allergies.    Past Medical History:  Carpal tunnel syndrome   Dry eye   Heartburn  Iron deficiency anemia  Long term use of plaquenil   Scleroderma  Vitamin D deficiency  Sicca syndrome  Systemic lupus erythematosus  Raynaud's phenomenon without gangrene    Past Surgical History:  Tubal ligation     Family History:   The patient was adopted.     Social History:   No tobacco use history.   No alcohol consumption.      Physical Exam:   /80  Pulse 78  Temp 98.1  F (36.7  C) (Oral)  Ht 1.499 m (4' 11\")  Wt 61 kg (134 lb 8 oz)  BMI 27.17 kg/m2   Wt Readings from Last 4 Encounters:   08/02/18 61 kg (134 lb 8 oz)   02/01/18 58.8 kg (129 lb 9.6 oz)   08/16/17 59.5 kg (131 lb 3.2 oz)   08/03/17 58.4 kg (128 lb 12.8 oz)   Constitutional: Well-developed, appearing stated age; cooperative  Eyes: Normal EOM, PERRLA, vision, conjunctiva, sclera  ENT: Normal external ears, nose, hearing, lips, teeth, gums, throat. No mucous membrane lesions, normal saliva pool  Neck: No mass or thyroid enlargement  Resp: Lungs clear to auscultation, nl to palpation  CV: RRR, no murmurs, rubs or gallops, no edema  GI: No ABD mass or tenderness, no HSM  : Not tested  Lymph: No cervical, supraclavicular, inguinal or epitrochlear nodes  MS: The TMJ, neck, shoulder, elbow, wrist, MCP, " spine, hip, knee, and ankle joints were examined and found normal. No active synovitis or altered joint anatomy. Full joint ROM. Normal  strength. No dactylitis,  tenosynovitis, enthesopathy. Trace synovitis in left 3rd MCP. Slight tenderness across her MTP archades on both sides.   Skin: No nail pitting, alopecia, rash, nodules or lesions  Neuro: Normal cranial nerves, strength, sensation, DTRs.   Psych: Normal judgement, orientation, memory, affect.    Laboratory:   Component      Latest Ref Rng & Units 1/12/2017   RNP Antibody IgG      0.0 - 0.9 AI >8.0 (H) . . .   Dan ERICK Antibody IgG      0.0 - 0.9 AI 0.2   SSA (Ro) (ERICK) Antibody, IgG      0.0 - 0.9 AI 2.3 (H)   SSB (La) (ERICK) Antibody, IgG      0.0 - 0.9 AI <0.2 . . .   Scleroderma Antibody Scl-70 ERICK IgG      0.0 - 0.9 AI 0.2   Cardiolipin Antibody IgG      0.0 - 19.9 GPL-U/mL <1.6 . . .   Cardiolipin Antibody IgM      0.0 - 19.9 MPL-U/mL 0.6   Lupus Result      NEG Negative . . .   Beta 2 Glycoprotein 1 Antibody IgG      <7 U/mL <0.6 . . .     Scribe Disclosure:   I, Faviola Gracia, am serving as a scribe to document services personally performed by Efrain Mckinney MD at this visit, based upon the provider's statements to me. All documentation has been reviewed by the aforementioned provider prior to being entered into the official medical record.     Portions of this medical record were completed by a scribe. UPON MY REVIEW AND AUTHENTICATION BY ELECTRONIC SIGNATURE, this confirms (a) I performed the applicable clinical services, and (b) the record is accurate.    JUANA Mckinney MD, PhD    Rheumatology

## 2018-08-02 NOTE — NURSING NOTE
"/80  Pulse 78  Temp 98.1  F (36.7  C) (Oral)  Ht 1.499 m (4' 11\")  Wt 61 kg (134 lb 8 oz)  BMI 27.17 kg/m2  Chief Complaint   Patient presents with     RECHECK     follow up with scleroderma, tzimmer cma       "

## 2018-08-02 NOTE — LETTER
"2018      RE: Stephanie Reed  [address]  [facility] [address]       [facility]  Rheumatology Clinic  Efrain Mckinney MD  2018     Name: Stephanie Reed  MRN: [id number]  Age: 48 year old  : 1970  Referring provider: Tori Severino     Assessment and Plan:  - Scleroderma (H)  - Mixed connective tissue disease (H)    We will plan to repeat pulmonary function testing and echocardiogram in 6 months prior to our follow up, for routine pulmonary screening despite her lack of symptoms.   - Long-term use of Plaquenil  The patient is doing well overall and is stable on plaquenil but did report some new DIP/PIP swelling and pain. We discussed continuing plaquenil. She has had no eye toxicity.     - Sicca syndrome (H)  We discussed risks, benefits, and side effects of using evoxac for her eye and mouth dryness, which we can consider in the future if her symptoms become more bothersome.     - Vitamin D deficiency  - Other secondary osteoarthritis of multiple sites  She can try using Voltaren gel for her hand pain, which may be helpful when used about 15 minutes prior to activities like cooking. Continue avoiding ibuprofen.     She did note some very intermittent leg cramping, and we discussed increasing calcium and potassium in her diet. If this becomes more frequent or bothersome, she will reach out to us.     - hydroxychloroquine (PLAQUENIL) 200 MG tablet  Dispense: 90 tablet; Refill: 3  - diclofenac (VOLTAREN) 1 % GEL topical gel  Dispense: 1 Tube; Refill: 11    Follow-up: 6 months     HPI:   Stephanie Reed is a 48 year old female who presents for followup of her MCTD with high-titer RNP antibodies, borderline rheumatoid factor, sclerodactyly, alopecia, joint symptoms, and low pulmonary volumes and DLCO on prior workups.     I last evaluated the patient on 2018; please see this note for details. At that time she felt \"back to normal\", although did note intermittent bilateral hand swelling and " generalized numbness on waking up at night 2-3 weeks prior to our evaluation, which had resolved. She continued to get Raynaud's attacks with severe cold weather but had no digital ulcers and did not find these particularly bothersome. Her Sicca symptoms were still present but not worsening. She additionally denied shortness of breath, rashes, other skin changes, or difficulty swallowing. The patient was stable on plaquenil therapy once daily.     Today, the patient continues to do well on plaquenil. She reports that about a month ago she developed pain and some swelling to her PIP and DIP joints, worst to the left middle finger, which is new for her. The swelling has improved, but she continues to have pain to the fingers. She denies any pain or swelling to the MCPs.     She continues to have dryness to her eyes and mouth and has been using artificial tears. She follows with a dentist and denies any cavities. She additionally denies difficulty swallowing or eating foods like bread and pasta, although she reports that sometimes food will come back up after eating. Her breathing is stable and she denies shortness of breath. She is able to climb a flight of stairs without developing dyspnea. She continued to have color changes in her hands in the cold, but this is improved compared to previously and she can get her fingertips to turn pink in 15-20 minutes and did not develop digital ulcers. Of note, she reports very intermittent left calf cramping, which last occurred yesterday evening. She denies any other complaints.     She has not used ibuprofen for her symptoms. Her last pulmonary function testing and echocardiogram were in 2015.     Review of Systems:   Pertinent items are noted in HPI, remainder of complete ROS is negative.    No recent problems with hearing or vision. No swallowing problems.   No breathing difficulty, shortness of breath, coughing, or wheezing  No chest pain or palpitations  No heart burn,  "abdominal pain, nausea, vomiting, diarrhea  No urination problems, no bloody, cloudy urine, no dysuria  No numbing, tingling, weakness  No headaches or confusion  No rashes. No easy bleeding or bruising.  + calf cramping      Active Medications:      artificial tears SOLN, Instill one drop twice daily, Disp: , Rfl:      cholecalciferol (VITAMIN D) 1000 UNIT tablet, Take 1 tablet (1,000 Units) by mouth daily, Disp: 100 tablet, Rfl: 3     hydroxychloroquine (PLAQUENIL) 200 MG tablet, Take 1 tablet (200 mg) by mouth daily (with dinner) Annual eye exams for monitoring., Disp: 30 tablet, Rfl: 3     loratadine (CLARITIN) 10 MG tablet, Take 1 tablet (10 mg) by mouth daily, Disp: 30 tablet, Rfl: 11      Allergies:   The patient reports no known allergies.    Past Medical History:  Carpal tunnel syndrome   Dry eye   Heartburn  Iron deficiency anemia  Long term use of plaquenil   Scleroderma  Vitamin D deficiency  Sicca syndrome  Systemic lupus erythematosus  Raynaud's phenomenon without gangrene    Past Surgical History:  Tubal ligation     Family History:   The patient was adopted.     Social History:   No tobacco use history.   No alcohol consumption.      Physical Exam:   /80  Pulse 78  Temp 98.1  F (36.7  C) (Oral)  Ht 1.499 m (4' 11\")  Wt 61 kg (134 lb 8 oz)  BMI 27.17 kg/m2   Wt Readings from Last 4 Encounters:   08/02/18 61 kg (134 lb 8 oz)   02/01/18 58.8 kg (129 lb 9.6 oz)   08/16/17 59.5 kg (131 lb 3.2 oz)   08/03/17 58.4 kg (128 lb 12.8 oz)   Constitutional: Well-developed, appearing stated age; cooperative  Eyes: Normal EOM, PERRLA, vision, conjunctiva, sclera  ENT: Normal external ears, nose, hearing, lips, teeth, gums, throat. No mucous membrane lesions, normal saliva pool  Neck: No mass or thyroid enlargement  Resp: Lungs clear to auscultation, nl to palpation  CV: RRR, no murmurs, rubs or gallops, no edema  GI: No ABD mass or tenderness, no HSM  : Not tested  Lymph: No cervical, supraclavicular, " inguinal or epitrochlear nodes  MS: The TMJ, neck, shoulder, elbow, wrist, MCP, spine, hip, knee, and ankle joints were examined and found normal. No active synovitis or altered joint anatomy. Full joint ROM. Normal  strength. No dactylitis,  tenosynovitis, enthesopathy. Trace synovitis in left 3rd MCP. Slight tenderness across her MTP archades on both sides.   Skin: No nail pitting, alopecia, rash, nodules or lesions  Neuro: Normal cranial nerves, strength, sensation, DTRs.   Psych: Normal judgement, orientation, memory, affect.    Laboratory:   Component      Latest Ref Rng & Units 1/12/2017   RNP Antibody IgG      0.0 - 0.9 AI >8.0 (H) . . .   Dan ERICK Antibody IgG      0.0 - 0.9 AI 0.2   SSA (Ro) (ERICK) Antibody, IgG      0.0 - 0.9 AI 2.3 (H)   SSB (La) (ERICK) Antibody, IgG      0.0 - 0.9 AI <0.2 . . .   Scleroderma Antibody Scl-70 ERICK IgG      0.0 - 0.9 AI 0.2   Cardiolipin Antibody IgG      0.0 - 19.9 GPL-U/mL <1.6 . . .   Cardiolipin Antibody IgM      0.0 - 19.9 MPL-U/mL 0.6   Lupus Result      NEG Negative . . .   Beta 2 Glycoprotein 1 Antibody IgG      <7 U/mL <0.6 . . .     Scribe Disclosure:   I, Faviola Gracia, am serving as a scribe to document services personally performed by Efrain Mckinney MD at this visit, based upon the provider's statements to me. All documentation has been reviewed by the aforementioned provider prior to being entered into the official medical record.     Portions of this medical record were completed by a scribe. UPON MY REVIEW AND AUTHENTICATION BY ELECTRONIC SIGNATURE, this confirms (a) I performed the applicable clinical services, and (b) the record is accurate.    JUANA Mckinney MD, PhD    Rheumatology

## 2018-09-19 DIAGNOSIS — Z79.899 LONG-TERM USE OF PLAQUENIL: Primary | ICD-10-CM

## 2018-09-20 ENCOUNTER — OFFICE VISIT (OUTPATIENT)
Dept: OPHTHALMOLOGY | Facility: CLINIC | Age: 48
End: 2018-09-20
Attending: OPHTHALMOLOGY
Payer: COMMERCIAL

## 2018-09-20 DIAGNOSIS — H04.123 TEAR FILM INSUFFICIENCY, BILATERAL: ICD-10-CM

## 2018-09-20 DIAGNOSIS — Z79.899 LONG-TERM USE OF PLAQUENIL: Primary | ICD-10-CM

## 2018-09-20 PROCEDURE — 92250 FUNDUS PHOTOGRAPHY W/I&R: CPT | Mod: ZF | Performed by: OPHTHALMOLOGY

## 2018-09-20 PROCEDURE — G0463 HOSPITAL OUTPT CLINIC VISIT: HCPCS | Mod: ZF

## 2018-09-20 PROCEDURE — 92015 DETERMINE REFRACTIVE STATE: CPT | Mod: ZF

## 2018-09-20 PROCEDURE — 92134 CPTRZ OPH DX IMG PST SGM RTA: CPT | Mod: ZF | Performed by: OPHTHALMOLOGY

## 2018-09-20 PROCEDURE — 92083 EXTENDED VISUAL FIELD XM: CPT | Mod: ZF | Performed by: OPHTHALMOLOGY

## 2018-09-20 ASSESSMENT — VISUAL ACUITY
OS_SC: 20/20
OD_SC: 20/20
OD_SC+: -1
OS_SC+: -1
METHOD: SNELLEN - LINEAR

## 2018-09-20 ASSESSMENT — REFRACTION_MANIFEST
OD_CYLINDER: +0.50
OD_AXIS: 010
OS_AXIS: 085
OD_SPHERE: -0.25
OS_SPHERE: -0.25
OD_ADD: +2.25
OS_CYLINDER: +0.25
OS_ADD: +2.25

## 2018-09-20 ASSESSMENT — TONOMETRY
IOP_METHOD: TONOPEN
OD_IOP_MMHG: 16
OS_IOP_MMHG: 18

## 2018-09-20 ASSESSMENT — REFRACTION_WEARINGRX
OS_AXIS: 95
OD_SPHERE: -0.25
OS_CYLINDER: +0.50
OD_ADD: +1.25
OS_ADD: +1.25
OD_AXIS: 55
OS_SPHERE: -0.25
OD_CYLINDER: +0.25

## 2018-09-20 ASSESSMENT — CONF VISUAL FIELD
OD_NORMAL: 1
METHOD: COUNTING FINGERS
OS_NORMAL: 1

## 2018-09-20 ASSESSMENT — CUP TO DISC RATIO
OS_RATIO: 0.6
OD_RATIO: 0.5

## 2018-09-20 ASSESSMENT — EXTERNAL EXAM - LEFT EYE: OS_EXAM: NORMAL

## 2018-09-20 ASSESSMENT — SLIT LAMP EXAM - LIDS
COMMENTS: NORMAL
COMMENTS: NORMAL

## 2018-09-20 ASSESSMENT — EXTERNAL EXAM - RIGHT EYE: OD_EXAM: NORMAL

## 2018-09-20 NOTE — MR AVS SNAPSHOT
After Visit Summary   9/20/2018    Stephanie Reed    MRN: [id number]           Patient Information     Date Of Birth          1970        Visit Information        Provider Department      9/20/2018 10:15 AM Craig Logan MD [facility]        Today's Diagnoses     Long-term use of Plaquenil    -  1    Tear film insufficiency, bilateral           Follow-ups after your visit        Follow-up notes from your care team     Return in about 1 year (around 9/20/2019) for DFE, Plaquenil exam, MAC TOP, OCT Macula, FAF.      Your next 10 appointments already scheduled     Jan 30, 2019  8:00 AM CST   Ech Complete with [id number]   [facility] Echo ([facility])    [address] [address] [phone]           1.  Please bring or wear a comfortable two-piece outfit. 2.  You may eat, drink and take your normal medicines. 3.  For any questions that cannot be answered, please contact the ordering physician 4.  Please do not wear perfumes or scented lotions on the day of your exam.            Jan 30, 2019  9:00 AM CST   FULL PULMONARY FUNCTION with UC PFL A   [facility] Pulmonary Function Testing ([facility])    [address] [id number] [phone]            Jan 30, 2019 10:00 AM CST   Six Minute Walk with UC PFL 6 MINUTE WALK 1   [facility] Pulmonary Function Testing ([facility])    [address] [id number] [phone]            Jan 31, 2019  7:30 AM CST   (Arrive by 7:15 AM)   RETURN SCLERODERMA with Efrain Mckinney MD   [facility] ([facility])    [address] [place] [id number] [phone]              Who to contact     Please call your clinic at [phone] to:    Ask questions about your health    Make or cancel appointments    Discuss  your medicines    Learn about your test results    Speak to your doctor            Additional Information About Your Visit        "[employer]hart Information     [employer] gives you secure access to your electronic health record. If you see a primary care provider, you can also send messages to your care team and make appointments. If you have questions, please call your primary care clinic.  If you do not have a primary care provider, please call [phone] and they will assist you.      [employer] is an electronic gateway that provides easy, online access to your medical records. With [employer], you can request a clinic appointment, read your test results, renew a prescription or communicate with your care team.     To access your existing account, please contact your [facility] or call [phone] for assistance.        Care EveryWhere ID     This is your Care EveryWhere ID. This could be used by other organizations to access your [place] medical records  [id number]         Blood Pressure from Last 3 Encounters:   08/02/18 119/80   02/01/18 122/76   08/16/17 124/74    Weight from Last 3 Encounters:   08/02/18 61 kg (134 lb 8 oz)   02/01/18 58.8 kg (129 lb 9.6 oz)   08/16/17 59.5 kg (131 lb 3.2 oz)              We Performed the Following     Fundus Autofluorescence Image (FAF) OU (both eyes)     Macula Top OU     OCT Retina Spectralis OU (both eyes)        Primary Care Provider Office Phone # Fax #    Tori Severino [phone]       [address] [address]        Equal Access to Services     CARLITOS SQUIRES : Hadii aad ku hadasho Soomaali, waaxda luqadaha, qaybta kaalmada adeegyada, yogi johnson . So [facility] [phone].    ATENCIÓN: Si habla español, tiene a miller disposición servicios gratuitos de asistencia lingüística. Llame al [phone].    We comply with applicable federal civil rights laws and [place] laws. We do  not discriminate on the basis of race, color, national origin, age, disability, sex, sexual orientation, or gender identity.            Thank you!     Thank you for choosing EYE CLINIC  for your care. Our goal is always to provide you with excellent care. Hearing back from our patients is one way we can continue to improve our services. Please take a few minutes to complete the written survey that you may receive in the mail after your visit with us. Thank you!             Your Updated Medication List - Protect others around you: Learn how to safely use, store and throw away your medicines at www.disposemymeds.org.          This list is accurate as of 9/20/18 12:41 PM.  Always use your most recent med list.                   Brand Name Dispense Instructions for use Diagnosis    artificial tears Soln      Instill one drop twice daily        diclofenac 1 % Gel topical gel    VOLTAREN    1 Tube    Apply 2 g topically 4 times daily as needed for moderate pain    Scleroderma (H), Mixed connective tissue disease (H), Vitamin D deficiency, Other secondary osteoarthritis of multiple sites       hydroxychloroquine 200 MG tablet    PLAQUENIL    90 tablet    Take 1 tablet (200 mg) by mouth daily (with dinner) Annual eye exams for monitoring.    Scleroderma (H), Mixed connective tissue disease (H), Sicca syndrome (H), Long-term use of Plaquenil       loratadine 10 MG tablet    CLARITIN    30 tablet    Take 1 tablet (10 mg) by mouth daily    Other allergic rhinitis

## 2018-09-20 NOTE — NURSING NOTE
Chief Complaints and History of Present Illnesses   Patient presents with     Follow Up For     Yearly follow up Plaquenil     HPI    Affected eye(s):  Both   Symptoms:     No floaters   No flashes   No redness   No tearing   No Dryness         Do you have eye pain now?:  No      Comments:  Pt states vision is about the same as last visit. No eye pain today. No flashers or floaters.  Pt taking Plaquenil 200mg BID for RA for the past 3-4 years.    Polly STALLINGS September 20, 2018 10:54 AM

## 2018-09-20 NOTE — LETTER
9/20/2018       RE: Stephanie Reed  [address]  [facility] [address]     Dear Colleague,    Thank you for referring your patient, Stephanie Reed, to the EYE CLINIC at [facility]. Please see a copy of my visit note below.    General Ophthalmology Clinic Note:    CC: Plaquenil eye exam    HPI: Stephanie Reed is a 48 year old female who presents for annual plaquenil eye exam. She began taking plaquenil > 5 years. Overall stable, slight change in clarity with reading left eye > right eye     PMH:  Lupus, Plaquenil use for ~5yrs (200mg daily)    POHx:  Wears glasses     Current Eye Medications:   Artificial tears PRN      Assessment & Plan   Stephanie Reed is a 48 year old female with the following diagnoses:     1. Long-term use of Plaquenil    2. Tear film insufficiency, bilateral        Screening of ocular sequale with Plaquenil use  No sign of plaquenil toxicity.  Using plaquenil >5 years  Per AAO guidelines, we will continue to monitor with dilated fundus exam annually.    Will repeat 10-2 and OCT macula annually   Continue artificial tears to 4x per day and Bedtime gel tears    Patient disposition:   Return in about 1 year (around 9/20/2019) for DFE, Plaquenil exam, MAC TOP, OCT Macula, FAF.      Attending Physician Attestation:  Complete documentation of historical and exam elements from today's encounter can be found in the full encounter summary report (not reduplicated in this progress note).  I personally obtained the chief complaint(s) and history of present illness.  I confirmed and edited as necessary the review of systems, past medical/surgical history, family history, social history, and examination findings as documented by others; and I examined the patient myself.  I personally reviewed the relevant tests, images, and reports as documented above.  I formulated and edited as necessary the assessment and plan and discussed the findings and management plan with the  patient and family. Attending  Craig Logan MD       Again, thank you for allowing me to participate in the care of your patient.      Sincerely,    Craig Logan MD

## 2018-09-20 NOTE — PROGRESS NOTES
General Ophthalmology Clinic Note:    CC: Plaquenil eye exam    HPI: Stephanie Reed is a 48 year old female who presents for annual plaquenil eye exam. She began taking plaquenil > 5 years. Overall stable, slight change in clarity with reading left eye > right eye     PMH:  Lupus, Plaquenil use for ~5yrs (200mg daily)    POHx:  Wears glasses     Current Eye Medications:   Artificial tears PRN      Assessment & Plan   Stephanie Reed is a 48 year old female with the following diagnoses:     1. Long-term use of Plaquenil    2. Tear film insufficiency, bilateral        Screening of ocular sequale with Plaquenil use  No sign of plaquenil toxicity.  Using plaquenil >5 years  Per AAO guidelines, we will continue to monitor with dilated fundus exam annually.    Will repeat 10-2 and OCT macula annually   Continue artificial tears to 4x per day and Bedtime gel tears    Patient disposition:   Return in about 1 year (around 9/20/2019) for DFE, Plaquenil exam, MAC TOP, OCT Macula, FAF.      Attending Physician Attestation:  Complete documentation of historical and exam elements from today's encounter can be found in the full encounter summary report (not reduplicated in this progress note).  I personally obtained the chief complaint(s) and history of present illness.  I confirmed and edited as necessary the review of systems, past medical/surgical history, family history, social history, and examination findings as documented by others; and I examined the patient myself.  I personally reviewed the relevant tests, images, and reports as documented above.  I formulated and edited as necessary the assessment and plan and discussed the findings and management plan with the patient and family. Attending  Craig Logan MD

## 2018-10-15 ENCOUNTER — HOSPITAL ENCOUNTER (EMERGENCY)
Facility: CLINIC | Age: 48
Discharge: HOME | End: 2018-10-15
Attending: EMERGENCY MEDICINE | Admitting: EMERGENCY MEDICINE
Payer: COMMERCIAL

## 2018-10-15 VITALS
OXYGEN SATURATION: 97 % | WEIGHT: 132 LBS | HEIGHT: 59 IN | SYSTOLIC BLOOD PRESSURE: 125 MMHG | DIASTOLIC BLOOD PRESSURE: 90 MMHG | BODY MASS INDEX: 26.61 KG/M2 | RESPIRATION RATE: 16 BRPM | TEMPERATURE: 98.4 F

## 2018-10-15 DIAGNOSIS — R51.9 SCALP PAIN: ICD-10-CM

## 2018-10-15 PROCEDURE — 99283 EMERGENCY DEPT VISIT LOW MDM: CPT

## 2018-10-15 PROCEDURE — 25000132 ZZH RX MED GY IP 250 OP 250 PS 637: Performed by: EMERGENCY MEDICINE

## 2018-10-15 RX ORDER — DIPHENHYDRAMINE HCL 25 MG
25 CAPSULE ORAL ONCE
Status: COMPLETED | OUTPATIENT
Start: 2018-10-15 | End: 2018-10-15

## 2018-10-15 RX ORDER — IBUPROFEN 600 MG/1
600 TABLET, FILM COATED ORAL ONCE
Status: COMPLETED | OUTPATIENT
Start: 2018-10-15 | End: 2018-10-15

## 2018-10-15 RX ORDER — ACETAMINOPHEN 500 MG
1000 TABLET ORAL ONCE
Status: COMPLETED | OUTPATIENT
Start: 2018-10-15 | End: 2018-10-15

## 2018-10-15 RX ADMIN — DIPHENHYDRAMINE HYDROCHLORIDE 25 MG: 25 CAPSULE ORAL at 14:05

## 2018-10-15 RX ADMIN — IBUPROFEN 600 MG: 600 TABLET, FILM COATED ORAL at 14:05

## 2018-10-15 RX ADMIN — ACETAMINOPHEN 1000 MG: 500 TABLET, FILM COATED ORAL at 14:05

## 2018-10-15 ASSESSMENT — ENCOUNTER SYMPTOMS
NECK PAIN: 0
VOMITING: 0
HEADACHES: 1
FEVER: 0
NAUSEA: 0

## 2018-10-15 NOTE — ED AVS SNAPSHOT
Emergency Department    [address] [facility] [address]    Phone:  [phone]    Fax:  [phone]                                       Stephanie Reed   MRN: [id number]    Department:   Emergency Department   Date of Visit:  10/15/2018           Patient Information     Date Of Birth          1970        Your diagnoses for this visit were:     Scalp pain        You were seen by Hieu Dove MD.      Follow-up Information     Call Tori Severino MD.    Specialty:  Family Practice    Contact information:    [address]   [facility] [address] [phone]          Follow up with  Emergency Department.    Specialty:  EMERGENCY MEDICINE    Why:  As needed, If symptoms worsen    Contact information:    [address] [facility] [id number] [phone]        Discharge Instructions       I believe your pain is from irritation to the scalp.  Please continue with ibuprofen and Benadryl as needed for the discomfort and try to limit any hair products at least for the next few days.  Please follow-up with your primary care doctor and return to the ER for further concerns.    Your next 10 appointments already scheduled     Jan 30, 2019  8:00 AM CST   Ech Complete with [id number]    [facility] ([facility])    [address] [place] [address] [phone]           1.  Please bring or wear a comfortable two-piece outfit. 2.  You may eat, drink and take your normal medicines. 3.  For any questions that cannot be answered, please contact the ordering physician 4.  Please do not wear perfumes or scented lotions on the day of your exam.            Jan 30, 2019  9:00 AM CST   FULL PULMONARY FUNCTION with  PFL A   [facility] Pulmonary Function Testing ([facility])    [address] [place] [address] [phone]            Jan 30, 2019 10:00 AM CST   Six Minute  Walk with  PFL 6 MINUTE WALK 1   [facility] Pulmonary Function Testing ([facility])    [address]  3rd Floor  [facility] [address]   [phone]            Jan 31, 2019  7:30 AM CST   (Arrive by 7:15 AM)   RETURN SCLERODERMA with Efrain Mckinney MD   [facility] ([facility])    [address]  [address]  [facility] [address]   [phone]            Sep 24, 2019  8:00 AM CDT   RETURN GENERAL with Craig Logan MD   [facility] ([facility])    [address]  [address] [address]  [facility] [address] [phone]              24 Hour Appointment Hotline       To make an appointment at any [facility], call [phone] ([phone]). If you don't have a family doctor or clinic, we will help you find one. [facility] are conveniently located to serve the needs of you and your family.             Review of your medicines      Our records show that you are taking the medicines listed below. If these are incorrect, please call your family doctor or clinic.        Dose / Directions Last dose taken    artificial tears Soln        Instill one drop twice daily   Refills:  0        diclofenac 1 % Gel topical gel   Commonly known as:  VOLTAREN   Dose:  2 g   Quantity:  1 Tube        Apply 2 g topically 4 times daily as needed for moderate pain   Refills:  11        hydroxychloroquine 200 MG tablet   Commonly known as:  PLAQUENIL   Dose:  200 mg   Quantity:  90 tablet        Take 1 tablet (200 mg) by mouth daily (with dinner) Annual eye exams for monitoring.   Refills:  3        loratadine 10 MG tablet   Commonly known as:  CLARITIN   Dose:  10 mg   Quantity:  30 tablet        Take 1 tablet (10 mg) by mouth daily   Refills:  11                Orders Needing Specimen Collection     None      Pending Results     No orders found from 10/13/2018 to 10/16/2018.             Pending Culture Results     No orders found from 10/13/2018 to 10/16/2018.            Pending Results Instructions     If you had any lab results that were not finalized at the time of your Discharge, you can call the ED Lab Result RN at [phone]. You will be contacted by this team for any positive Lab results or changes in treatment. The nurses are available 7 days a week from 10A to 6:30P.  You can leave a message 24 hours per day and they will return your call.        Test Results From Your Hospital Stay               Clinical Quality Measure: Blood Pressure Screening     Your blood pressure was checked while you were in the emergency department today. The last reading we obtained was  BP: 125/90 . Please read the guidelines below about what these numbers mean and what you should do about them.  If your systolic blood pressure (the top number) is less than 120 and your diastolic blood pressure (the bottom number) is less than 80, then your blood pressure is normal. There is nothing more that you need to do about it.  If your systolic blood pressure (the top number) is 120-139 or your diastolic blood pressure (the bottom number) is 80-89, your blood pressure may be higher than it should be. You should have your blood pressure rechecked within a year by a primary care provider.  If your systolic blood pressure (the top number) is 140 or greater or your diastolic blood pressure (the bottom number) is 90 or greater, you may have high blood pressure. High blood pressure is treatable, but if left untreated over time it can put you at risk for heart attack, stroke, or kidney failure. You should have your blood pressure rechecked by a primary care provider within the next 4 weeks.  If your provider in the emergency department today gave you specific instructions to follow-up with your doctor or provider even sooner than that, you should follow that instruction and not wait for up to 4 weeks for your  follow-up visit.        Thank you for choosing [facility]       Thank you for choosing [facility] for your care. Our goal is always to provide you with excellent care. Hearing back from our patients is one way we can continue to improve our services. Please take a few minutes to complete the written survey that you may receive in the mail after you visit with us. Thank you!        [employer] Information     [employer] gives you secure access to your electronic health record. If you see a primary care provider, you can also send messages to your care team and make appointments. If you have questions, please call your primary care clinic.  If you do not have a primary care provider, please call [phone] and they will assist you.        Care EveryWhere ID     This is your Care EveryWhere ID. This could be used by other organizations to access your [facility] medical records  [id number]        Equal Access to Services     CARLITOS SQUIRES : Sujatha East, rajwinder clancy, yogi flores. So [facility] [phone].    ATENCIÓN: Si habla español, tiene a miller disposición servicios gratuitos de asistencia lingüística. Llame al [phone].    We comply with applicable federal civil rights laws and [place] laws. We do not discriminate on the basis of race, color, national origin, age, disability, sex, sexual orientation, or gender identity.            After Visit Summary       This is your record. Keep this with you and show to your community pharmacist(s) and doctor(s) at your next visit.

## 2018-10-15 NOTE — ED AVS SNAPSHOT
Emergency Department    [address] [place] [address]    Phone:  [phone]    Fax:  [phone]                                       Stephanie Reed   MRN: [id number]    Department:   Emergency Department   Date of Visit:  10/15/2018           After Visit Summary Signature Page     I have received my discharge instructions, and my questions have been answered. I have discussed any challenges I see with this plan with the nurse or doctor.    ..........................................................................................................................................  Patient/Patient Representative Signature      ..........................................................................................................................................  Patient Representative Print Name and Relationship to Patient    ..................................................               ................................................  Date                                   Time    ..........................................................................................................................................  Reviewed by Signature/Title    ...................................................              ..............................................  Date                                               Time          22EPIC Rev 08/18

## 2018-10-15 NOTE — ED NOTES
Patient reports left sided posterior head pain that started Saturday.  Pt denies any injury.  Pt describes pain as intermittent and throbbing.

## 2018-10-15 NOTE — ED PROVIDER NOTES
"  History     Chief Complaint:  Headache    HPI   Stephanie Reed is a 48 year old female with a medical history of systemic lupus erythematosus who presents with a headache. The patient reports a new onset of a right occipital headache 2 days ago. She states her pain comes and goes in brief episodes lasting anywhere between 30 seconds and 1 minute. Patient has been having difficulty sleeping secondary to pain. She has been taking Tylenol yesterday without any improvement. No recent head trauma. The patient notes cough and fatigue a couple of days ago but she is not experiencing those symptoms now. Of note, patient notes she had an allergic reaction to a shampoo a year ago. No recent new hair shampoo use. Denies fevers, nausea, vomiting, neck pain or any other symptoms at this time. No personal history of hypertension or diabetes.     Allergies:  No known drug allergies     Medications:    Plaquenil  Claritin     Past Medical History:    Carpal tunnel syndrome  Heartburn  Seasonal allergic rhinitis   Systemic lupus erythematosus   Scleroderma  Raynaud's phenomenon without gangrene    Past Surgical History:    Tubal ligation    Family History:    History reviewed. No pertinent family history.      Social History:  Smoking status: Never smoker  Alcohol use: No   Marital Status:   [2]  PCP: Tori Severino  Accompanied to the ED by daughter.     Review of Systems   Constitutional: Negative for fever.   Gastrointestinal: Negative for nausea and vomiting.   Musculoskeletal: Negative for neck pain.   Neurological: Positive for headaches.   All other systems reviewed and are negative.    Physical Exam   Patient Vitals for the past 24 hrs:   BP Temp Temp src Heart Rate Resp SpO2 Height Weight   10/15/18 1430 125/90 - - - - - - -   10/15/18 1415 - - - - - 97 % - -   10/15/18 1400 119/81 - - - - 100 % - -   10/15/18 1345 132/81 - - - - - - -   10/15/18 1111 127/79 98.4  F (36.9  C) Oral 85 16 100 % 1.499 m (4' 11\") 59.9 " kg (132 lb)      Physical Exam  General: Appears well-developed and well-nourished.   Head: No signs of trauma. Scalp irritation at front and back of scalp.  Tenderness to scalp at area of discomfort, but not remainder of scalp.  Mouth/Throat: Oropharynx is clear and moist.   Eyes: Conjunctivae are normal. Pupils are equal, round, and reactive to light.   Neck: Normal range of motion. No nuchal rigidity. No cervical adenopathy  CV: Normal rate and regular rhythm.    Resp: Effort normal and breath sounds normal. No respiratory distress.   MSK: Normal range of motion. no edema. No Calf tenderness.  Neuro: The patient is alert and oriented to person, place, and time.  PERRLA, EOMI, strength in upper/lower extremities normal and symmetrical.   Sensation normal. Speech normal.  GCS 15  Skin: Skin is warm and dry. No rash noted.   Psych: normal mood and affect. behavior is normal.       Emergency Department Course   Interventions:  1405: Tylenol 1000 mg oral  1405: Advil/Motrin 600 mg oral  1405: Benadryl 25 mg oral     Emergency Department Course:  Past medical records, nursing notes, and vitals reviewed.  1348: I performed an exam of the patient and obtained history, as documented above.   Patient was given the above interventions while here in the emergency department.    I rechecked the patient. Findings and plan explained to the Patient. Patient discharged home with instructions regarding supportive care, medications, and reasons to return. The importance of close follow-up was reviewed.      Impression & Plan    Medical Decision Making:  Stephanie Reed is a 48-year-old woman presents due to left head pain. Apparently for the last couple of days she has had episodes where she had pain for a couple of seconds before it resolves.  She actually had one episode while I was in the room where she would wince in pain but then very quickly it completely resolved.  On my evaluation, she seemed to have some sensitivity to the  scalp.  There is no clear rash at the area of sensitivity but pulling on the hairs, some discomfort there would not be expected with that movement.  Remainder of exam was very benign.  Given her history and exam, I have a low suspicion for significant intracranial process and I do not feel that advanced imaging was necessary.  She was given ibuprofen and Benadryl with resolution of the symptoms.  Seems that her symptoms are mostly from irritation to the scalp.  She was recommended to limit any scalp products and to continue with ibuprofen and Benadryl as needed and to follow-up with the primary care doctor.      Diagnosis:    ICD-10-CM   1. Scalp pain R51     Disposition:  discharged to home    Kary Yoon  10/15/2018    EMERGENCY DEPARTMENT  I, Kary Do, am serving as a scribe at 1:48 PM on 10/15/2018 to document services personally performed by Hieu Dove MD based on my observations and the provider's statements to me.       Hieu Dove MD  10/20/18 8904

## 2018-10-15 NOTE — DISCHARGE INSTRUCTIONS
I believe your pain is from irritation to the scalp.  Please continue with ibuprofen and Benadryl as needed for the discomfort and try to limit any hair products at least for the next few days.  Please follow-up with your primary care doctor and return to the ER for further concerns.

## 2019-03-14 ENCOUNTER — OFFICE VISIT (OUTPATIENT)
Dept: PULMONOLOGY | Facility: CLINIC | Age: 49
End: 2019-03-14
Attending: INTERNAL MEDICINE

## 2019-03-14 VITALS
RESPIRATION RATE: 16 BRPM | OXYGEN SATURATION: 100 % | SYSTOLIC BLOOD PRESSURE: 123 MMHG | HEART RATE: 73 BPM | HEIGHT: 59 IN | BODY MASS INDEX: 26.23 KG/M2 | WEIGHT: 130.1 LBS | DIASTOLIC BLOOD PRESSURE: 75 MMHG

## 2019-03-14 DIAGNOSIS — M32.9 SYSTEMIC LUPUS ERYTHEMATOSUS, UNSPECIFIED SLE TYPE, UNSPECIFIED ORGAN INVOLVEMENT STATUS (H): ICD-10-CM

## 2019-03-14 DIAGNOSIS — R06.09 DOE (DYSPNEA ON EXERTION): ICD-10-CM

## 2019-03-14 DIAGNOSIS — M35.1 MIXED CONNECTIVE TISSUE DISEASE (H): Primary | ICD-10-CM

## 2019-03-14 DIAGNOSIS — G56.03 BILATERAL CARPAL TUNNEL SYNDROME: ICD-10-CM

## 2019-03-14 PROCEDURE — G0463 HOSPITAL OUTPT CLINIC VISIT: HCPCS | Mod: ZF

## 2019-03-14 ASSESSMENT — MIFFLIN-ST. JEOR: SCORE: 1120.76

## 2019-03-14 ASSESSMENT — PAIN SCALES - GENERAL: PAINLEVEL: NO PAIN (0)

## 2019-03-14 NOTE — LETTER
3/14/2019       RE: Stephanie Reed  [address]  [facility] [address]     Dear Colleague,    Thank you for referring your patient, Stephanie Reed, to the [facility] at [facility]. Please see a copy of my visit note below.    [facility]  Rheumatology Clinic  Efrain Mckinney MD  2019     Name: Stephanie Reed  MRN: [id number]  Age: 48 year old  : 1970  Referring provider: Tori Severino    ASSESSMENT:   1. Mixed connective tissue disease with high titer RNP positivity, low titer rheumatoid factor and clinically with predominantly scleroderma features with typical skin thickening and skin biopsy consistent with the diagnosis in .   2. History of good improvement of skin with phototherapy plus CellCept managed through Dr. Sarmiento (no longer on cellcept).  3. Marked sicca symptoms improved with Evoxac (no longer taking).  4. GERD with normal gastric motility study, requiring no medications for control currently.  5. History of cardiopulmonary symptoms with history of slightly elevated NT-proBNP currently normal and essentially normal echocardiogram in 2007 showing only trace TR, but with recent development of several-minute long symptoms of palpitations and difficulty breathing.   6. Intermittently painful stiff joints somewhat improved with ibuprofen.  7. History of carpal tunnel syndrome.   8. Vitamin D deficiency.     PLAN:  - Bilateral wrist splints for carpal tunnel  - Okay to discontinue plaquenil given intolerable side effects (sweating); I do think however this requires of us that we monitor her overall disease process a little more carefully given her historically multiple abnormal serologies.  - Check TSH, C3/C4, CRP, ESR, ds-DNA, CBC, Creatinine, UA, PFTs  - F/u in 6 months    Pt seen and discussed with attending Dr. Faye Samuel MD  Internal Medicine PGY3    I saw the patient with the resident.  My exam  and recomendations are as described.      Efrain Mckinney MD      Follow-up: Data Unavailable     HPI:   Stephanie Reed is a 48 year old female who presents for followup of her MCTD with high-titer RNP antibodies, borderline rheumatoid factor, sclerodactyly, alopecia, joint symptoms, and low pulmonary volumes and DLCO on prior workups.  she was last seen on 08/02/2018, at which time disease process was overall stable, although she did report some new DIP/PIP swelling and pain. Plan was to try Voltaren gel for the hands and continue plaquenil.     She was evaluated by Dr. Logan of ophthalmology on 09/12/2018. There was no sign of plaquenil toxicity on exam and OCT.     Today, pt endorses ongoing bilateral hand numbness affecting all five fingers bilaterally that occurs mostly at night. Hands are sometimes puffy in the morning but this self-resolves by early morning. No finger or wrist pain. Pt still has ongoing Raynaud symptoms. She self-discontinued plaquenil because it made her feel sweaty and hot; these symptoms are now better since stopping plaquenil. No recurrent dry mouth. No dyspnea on exertion or lower extremity swelling.       Review of Systems:   No recent problems with hearing or vision. No swallowing problems.   No breathing difficulty, shortness of breath, coughing, or wheezing  No chest pain or palpitations  No heart burn, indigestion, abdominal pain, nausea, vomiting, diarrhea  No urination problems, no bloody, cloudy urine, no dysuria  No numbing, tingling, weakness  No headaches or confusion  No rashes. No easy bleeding or bruising.       Active Medications:     Current Outpatient Medications:      artificial tears SOLN, Instill one drop twice daily, Disp: , Rfl:      diclofenac (VOLTAREN) 1 % GEL topical gel, Apply 2 g topically 4 times daily as needed for moderate pain, Disp: 1 Tube, Rfl: 11     hydroxychloroquine (PLAQUENIL) 200 MG tablet, Take 1 tablet (200 mg) by mouth daily (with dinner) Annual  eye exams for monitoring., Disp: 90 tablet, Rfl: 3     loratadine (CLARITIN) 10 MG tablet, Take 1 tablet (10 mg) by mouth daily, Disp: 30 tablet, Rfl: 11      Allergies:   The patient reports no known allergies.     Past Medical History:  Carpal tunnel syndrome   Dry eye   Heartburn  Iron deficiency anemia  Long term use of plaquenil   Scleroderma  Vitamin D deficiency  Sicca syndrome  Systemic lupus erythematosus  Raynaud's phenomenon without gangrene     Past Surgical History:  Tubal ligation      Family History:   The patient was adopted.      Social History:   No tobacco use history.   No alcohol consumption.      Physical Exam:   There were no vitals taken for this visit.   Wt Readings from Last 4 Encounters:   10/15/18 59.9 kg (132 lb)   08/02/18 61 kg (134 lb 8 oz)   02/01/18 58.8 kg (129 lb 9.6 oz)   08/16/17 59.5 kg (131 lb 3.2 oz)     Constitutional: Well-developed, appearing stated age; cooperative  Eyes: Normal EOM, PERRLA, vision, conjunctiva, sclera  ENT: Normal external ears, nose, hearing, lips, teeth, gums, throat. No mucous membrane lesions, normal saliva pool  Neck: No mass or thyroid enlargement  Resp: Lungs clear to auscultation, nl to palpation  CV: RRR, no murmurs, rubs or gallops, no edema  GI: No ABD mass or tenderness, no HSM  : Not tested  Lymph: No cervical, supraclavicular, inguinal or epitrochlear nodes  MS: The TMJ, neck, shoulder, elbow, wrist, MCP/PIP/DIP, spine, hip, knee, ankle, and foot MTP/IP joints were examined and found normal. No active synovitis or altered joint anatomy. Full joint ROM. Normal  strength. No dactylitis,  tenosynovitis, enthesopathy.  Skin: No nail pitting, alopecia, rash, nodules or lesions  Neuro: Normal cranial nerves, strength, sensation, DTRs.   Psych: Normal judgement, orientation, memory, affect.       Laboratory:   RHEUM RESULTS Latest Ref Rng & Units 6/16/2016 10/21/2016 10/26/2016   COMPLEMENT C3 76 - 169 mg/dL 104 - -   COMPLEMENT C4 15 - 50  mg/dL - - -   DNA-DS <10 IU/mL 4 - -   SED RATE 0 - 20 mm/h 62(H) - -   CRP, INFLAMMATION 0.0 - 8.0 mg/L <2.9 - -   CK TOTAL 30 - 225 U/L 82 - -   RHEUMATOID FACTOR <20 IU/mL <20 - -   MOISE SCREEN BY EIA - - - -   AST 0 - 45 U/L - - -   ALT 0 - 50 U/L 22 - -   ALBUMIN 3.9 - 5.1 g/dL - - -   WBC 4.0 - 11.0 10e9/L 3.5(L) 3.5(L) -   RBC 3.8 - 5.2 10e12/L 4.19 4.17 -   HGB 11.7 - 15.7 g/dL 10.2(L) 10.2(L) 10.5(L)   HCT 35.0 - 47.0 % 33.3(L) 31.9(L) -   MCV 78 - 100 fl 80 77(L) -   MCHC 31.5 - 36.5 g/dL 30.6(L) 32.0 -   RDW 10.0 - 15.0 % 15.9(H) 15.6(H) -    - 450 10e9/L 230 289 -   CREATININE 0.52 - 1.04 mg/dL 0.41(L) 0.55 -   GFR ESTIMATE, IF BLACK >60 mL/min/1.7m2 >90  African American GFR Calc   >90   GFR Calc   -   GFR ESTIMATE >60 mL/min/1.7m2 >90  Non African American GFR Calc   >90  Non  GFR Calc   -    - 1,620 mg/dL - - -   IGA 70 - 380 mg/dL - - -   IGM 60 - 265 mg/dL - - -   HEP B SURFACE JAMA 0.0 - 4.9 mIU/mL - - -   HEPATITIS C ANTIBODY NEG - - -       Rheumatoid Factor   Date Value Ref Range Status   06/16/2016 <20 <20 IU/mL Final   ,  ,  ,  ,   Scleroderma Antibody Scl-70 ERICK IgG   Date Value Ref Range Status   01/12/2017 0.2 0.0 - 0.9 AI Final     Comment:     Negative   Antibody index (AI) values reflect qualitative changes in antibody   concentration that cannot be directly associated with clinical condition or   disease state.       SSA (Ro) (ERICK) Antibody, IgG   Date Value Ref Range Status   01/12/2017 2.3 (H) 0.0 - 0.9 AI Final     Comment:     Positive   Antibody index (AI) values reflect qualitative changes in antibody   concentration that cannot be directly associated with clinical condition or   disease state.       SSB (La) (ERICK) Antibody, IgG   Date Value Ref Range Status   01/12/2017  0.0 - 0.9 AI Final    <0.2  Negative   Antibody index (AI) values reflect qualitative changes in antibody   concentration that cannot be directly associated with  clinical condition or   disease state.       ,   Ribonucleic Protein IgG Antibody   Date Value Ref Range Status   05/05/2009 199 (H)  Final     Comment:     Reference range: 0 to 40  Unit: AU/mL  (Note)  REFERENCE INTERVAL: Ribonucleic Protein (ERICK), IgG   29 AU/mL or Less ............. Negative   30 - 40 AU/mL ................ Equivocal   41 AU/mL or Greater .......... Positive    RNP antibody is seen in % of mixed connective tissue  disease and is considered specific for this syndrome if  other antibodies are negative. RNP is also present in  20-30% of systemic lupus erythematosus (SLE) and 15-25%  of progressive systemic sclerosis (PSS).  Performed by [facility],  [address] [facility],[place] [id number] [phone]  [email or web address], Jeffery Stephens MD - Lab. Director     Dan Antibody IgG   Date Value Ref Range Status   08/08/2007 43  Final     Comment:     Reference range: 0 to 49  Unit: Units  (Note)  REFERENCE INTERVALS: SMITH (ERICK) Ab, IgG   Less than 20 Units ...... None detected   20 - 49 Units ........... Inconclusive   50 Units or greater ..... Positive    Dan antibody is very specific forsystemic lupus  erythematosus (SLE) but only occurs in 30-35% of SLE  cases. The presence of antibodies to Dan is often  associated with renal disease.  Performed by [facility],  [address] [facility], [place] [id number] [phone]  [email or web address],Jeffery Stephens MD - Lab. Director     SSA (RO) Antibody IgG   Date Value Ref Range Status   05/05/2009 39  Final     Comment:     Reference range: 0 to 40  Unit: AU/mL  (Note)  REFERENCE INTERVALS: SSA (Ro) (ERICK) Ab, IgG   29 AU/mL or Less ............. Negative   30 - 40 AU/mL ................ Equivocal   41 AU/mL or Greater .......... Positive    SSA (Ro) antibody is seenin 70-75% of Sjogren syndrome  cases, 30-40% of systemic lupus erythematosus (SLE) and  5-10% of progressive systemic sclerosis (PSS).  Performed by [facility],  [address] [place] [address]  [address] [phone]  [email or web address], Jeffery Stephens MD - Lab. Director     SSB (LA) Antibody IgG   Date Value Ref Range Status   05/05/2009 0  Final     Comment:     Reference range: 0 to 40  Unit: AU/mL  (Note)  REFERENCE INTERVALS: SSB (La) (ERICK) Ab, IgG   29 AU/mL or Less ............. Negative   30 - 40 AU/mL ................ Equivocal   41 AU/mL or Greater .......... Positive    SSB (La) antibody is seen in 50-60% of Sjogren syndrome  cases and is specific if it is the only ERICK antibody  present. 15-25% of patients with systemic lupus  erythematosus (SLE) and 5-10% of patients with progressive  systemic sclerosis (PSS) also have this antibody.  Performed by [facility],  [address] [place] [address] [phone]  [email or web address], Jeffery Stephens MD - Lab. Director     Scleroderma Antibody IgG   Date Value Ref Range Status   09/07/2006 6  Final     Comment:     Reference range: 0 to 49  Unit: Units  (Note)  REFERENCE INTERVALS: Scleroderma (Scl-70) (ERICK) Ab, IgG   Less than 20 Units ....... None detected   20 - 49 Units ............ Inconclusive   50 Units or greater ...... Positive    Scleroderma (Scl-70) antibody is seen in 20-60% of  patients with scleroderma and is considered diagnostic  and specific for scleroderma if it is the only ERICK  antibody present. Scl-70 is also seen in approximately  25% of progressive systemic sclerosis (PSS).  The above test was performed at: [facility],  [address] [place] [address]  [address]  [phone]  [email or web address]   ,  ,   MOISE Screen by EIA   Date Value Ref Range Status   05/23/2006 >10.0  Final     Comment:     Interpretation:  Positive   MOISE screen results > 3 are significant and follow-up testing is recommended.   MOISE samples are retained in the Protein Lab for 30 days.  Please contact the   laboratory to request additional tests.   ,   DNA-ds   Date Value Ref Range Status   06/16/2016 4 <10 IU/mL Final     Comment:     Negative   ,  ,   Beta 2  Glycoprotein 1 Antibody IgG   Date Value Ref Range Status   01/12/2017 <0.6  Negative   <7 U/mL Final   ,   Cardiolipin Antibody IgG   Date Value Ref Range Status   01/12/2017  0.0 - 19.9 GPL-U/mL Final    <1.6  Negative   New method in use November 28, 2016.       Cardiolipin Antibody IgM   Date Value Ref Range Status   01/12/2017 0.6 0.0 - 19.9 MPL-U/mL Final     Comment:     Negative   New method in use November 28, 2016.       ,  ,  ,  ,   Hep B Surface Agn   Date Value Ref Range Status   06/14/2006 Negative NEG Final   ,  ,  ,  ,  ,  ,  ,   Centromere Zoe IgG   Date Value Ref Range Status   05/05/2009 1  Final     Comment:     Reference range: 0 to 40  Unit: AU/mL  (Note)  TEST INFORMATION: Centromere Ab, IgG   29 AU/mL or Less ............. Negative   30 - 40 AU/mL ................ Equivocal   41 AU/mL or Greater .......... Positive    Centromere antibodies are presentin 80-90% of individuals  with  CREST variant scleroderma.  This antibody is also seen in  30% of  Raynaud patients, 12% of patients with mixed  connective-tissue  disease, diffuse scleroderma, interstitial pulmonary  fibrosis,  primary biliary cirrhosis, and in a smaller percent of  patients  with systemic lupus erythematosus (SLE) and RA.  Performed by [facility],  [address] [place] [id number] [phone]  [email or web address], Jeffery Stephens MD - Lab. Director   ,  ,  ,  ,  ,  ,  ,  ,  ,  ,  ,  ,  ,   Albumin Fraction   Date Value Ref Range Status   05/22/2014 4.0 3.7 - 5.1 g/dL Final     Alpha 2 Fraction   Date Value Ref Range Status   05/22/2014 0.8 0.5 - 0.9 g/dL Final     Beta Fraction   Date Value Ref Range Status   05/22/2014 1.2 (H) 0.6 - 1.0 g/dL Final     Gamma Fraction   Date Value Ref Range Status   05/22/2014 1.9 (H) 0.7 - 1.6 g/dL Final     Monoclonal Peak   Date Value Ref Range Status   05/22/2014 0.0 0.0 g/dL Final     ELP Interpretation:   Date Value Ref Range Status   05/22/2014   Final    Polyclonal increase in the  gamma fraction and increased beta fraction.  No   monoclonal protein seen. This pattern is seen in a variety of inflammatory   disorders.  Pathologic significance requires clinical correlation.  ROSCOE Blanco M.D., Ph.D., Pathologist       ,  ,   Immunofixation ELP   Date Value Ref Range Status   05/22/2014   Final    No monoclonal protein seen on immunofixation.  Pathological significance   requires clinical correlation.   ROSCOE Blanco M.D., Ph.D.       IGG   Date Value Ref Range Status   05/22/2014 1,790 (H) 695 - 1,620 mg/dL Final     IGA   Date Value Ref Range Status   05/22/2014 631 (H) 70 - 380 mg/dL Final     IGM   Date Value Ref Range Status   05/22/2014 202 60 - 265 mg/dL Final   ,  ,  ,  ,   Dan ERICK Antibody IgG   Date Value Ref Range Status   01/12/2017 0.2 0.0 - 0.9 AI Final     Comment:     Negative   Antibody index (AI) values reflect qualitative changes in antibody   concentration that cannot be directly associated with clinical condition or   disease state.       ,   Scleroderma Antibody Scl-70 ERICK IgG   Date Value Ref Range Status   01/12/2017 0.2 0.0 - 0.9 AI Final     Comment:     Negative   Antibody index (AI) values reflect qualitative changes in antibody   concentration that cannot be directly associated with clinical condition or   disease state.       Scribe Disclosure:   I, Faviola Gracia, am serving as a scribe to document services personally performed by Efrain Mckinney MD at this visit, based upon the provider's statements to me. All documentation has been reviewed by the aforementioned provider prior to being entered into the official medical record.     Portions of this medical record were completed by a scribe. UPON MY REVIEW AND AUTHENTICATION BY ELECTRONIC SIGNATURE, this confirms (a) I performed the applicable clinical services, and (b) the record is accurate.      Again, thank you for allowing me to participate in the care of your patient.      Sincerely,    Efrain BLISS  MD Faye

## 2019-03-14 NOTE — PROGRESS NOTES
[facility]  Rheumatology Clinic  Efrain Mckinney MD  2019     Name: Stephanie Reed  MRN: [id number]  Age: 48 year old  : 1970  Referring provider: Tori Severino    ASSESSMENT:   1. Mixed connective tissue disease with high titer RNP positivity, low titer rheumatoid factor and clinically with predominantly scleroderma features with typical skin thickening and skin biopsy consistent with the diagnosis in .   2. History of good improvement of skin with phototherapy plus CellCept managed through Dr. Sarmiento (no longer on cellcept).  3. Marked sicca symptoms improved with Evoxac (no longer taking).  4. GERD with normal gastric motility study, requiring no medications for control currently.  5. History of cardiopulmonary symptoms with history of slightly elevated NT-proBNP currently normal and essentially normal echocardiogram in 2007 showing only trace TR, but with recent development of several-minute long symptoms of palpitations and difficulty breathing.   6. Intermittently painful stiff joints somewhat improved with ibuprofen.  7. History of carpal tunnel syndrome.   8. Vitamin D deficiency.     PLAN:  - Bilateral wrist splints for carpal tunnel  - Okay to discontinue plaquenil given intolerable side effects (sweating); I do think however this requires of us that we monitor her overall disease process a little more carefully given her historically multiple abnormal serologies.  - Check TSH, C3/C4, CRP, ESR, ds-DNA, CBC, Creatinine, UA, PFTs  - F/u in 6 months    Pt seen and discussed with attending Dr. Faye Samuel MD  Internal Medicine PGY3    I saw the patient with the resident.  My exam and recomendations are as described.      Efrain Mckinney MD      Follow-up: Data Unavailable     HPI:   Stephanie Reed is a 48 year old female who presents for followup of her MCTD with high-titer RNP antibodies, borderline rheumatoid factor, sclerodactyly, alopecia, joint symptoms, and low  pulmonary volumes and DLCO on prior workups. she was last seen on 08/02/2018, at which time disease process was overall stable, although she did report some new DIP/PIP swelling and pain. Plan was to try Voltaren gel for the hands and continue plaquenil.     She was evaluated by Dr. Logan of ophthalmology on 09/12/2018. There was no sign of plaquenil toxicity on exam and OCT.     Today, pt endorses ongoing bilateral hand numbness affecting all five fingers bilaterally that occurs mostly at night. Hands are sometimes puffy in the morning but this self-resolves by early morning. No finger or wrist pain. Pt still has ongoing Raynaud symptoms. She self-discontinued plaquenil because it made her feel sweaty and hot; these symptoms are now better since stopping plaquenil. No recurrent dry mouth. No dyspnea on exertion or lower extremity swelling.       Review of Systems:   No recent problems with hearing or vision. No swallowing problems.   No breathing difficulty, shortness of breath, coughing, or wheezing  No chest pain or palpitations  No heart burn, indigestion, abdominal pain, nausea, vomiting, diarrhea  No urination problems, no bloody, cloudy urine, no dysuria  No numbing, tingling, weakness  No headaches or confusion  No rashes. No easy bleeding or bruising.       Active Medications:     Current Outpatient Medications:      artificial tears SOLN, Instill one drop twice daily, Disp: , Rfl:      diclofenac (VOLTAREN) 1 % GEL topical gel, Apply 2 g topically 4 times daily as needed for moderate pain, Disp: 1 Tube, Rfl: 11     hydroxychloroquine (PLAQUENIL) 200 MG tablet, Take 1 tablet (200 mg) by mouth daily (with dinner) Annual eye exams for monitoring., Disp: 90 tablet, Rfl: 3     loratadine (CLARITIN) 10 MG tablet, Take 1 tablet (10 mg) by mouth daily, Disp: 30 tablet, Rfl: 11      Allergies:   The patient reports no known allergies.     Past Medical History:  Carpal tunnel syndrome   Dry eye   Heartburn  Iron  deficiency anemia  Long term use of plaquenil   Scleroderma  Vitamin D deficiency  Sicca syndrome  Systemic lupus erythematosus  Raynaud's phenomenon without gangrene     Past Surgical History:  Tubal ligation      Family History:   The patient was adopted.      Social History:   No tobacco use history.   No alcohol consumption.      Physical Exam:   There were no vitals taken for this visit.   Wt Readings from Last 4 Encounters:   10/15/18 59.9 kg (132 lb)   08/02/18 61 kg (134 lb 8 oz)   02/01/18 58.8 kg (129 lb 9.6 oz)   08/16/17 59.5 kg (131 lb 3.2 oz)     Constitutional: Well-developed, appearing stated age; cooperative  Eyes: Normal EOM, PERRLA, vision, conjunctiva, sclera  ENT: Normal external ears, nose, hearing, lips, teeth, gums, throat. No mucous membrane lesions, normal saliva pool  Neck: No mass or thyroid enlargement  Resp: Lungs clear to auscultation, nl to palpation  CV: RRR, no murmurs, rubs or gallops, no edema  GI: No ABD mass or tenderness, no HSM  : Not tested  Lymph: No cervical, supraclavicular, inguinal or epitrochlear nodes  MS: The TMJ, neck, shoulder, elbow, wrist, MCP/PIP/DIP, spine, hip, knee, ankle, and foot MTP/IP joints were examined and found normal. No active synovitis or altered joint anatomy. Full joint ROM. Normal  strength. No dactylitis,  tenosynovitis, enthesopathy.  Skin: No nail pitting, alopecia, rash, nodules or lesions  Neuro: Normal cranial nerves, strength, sensation, DTRs.   Psych: Normal judgement, orientation, memory, affect.       Laboratory:   RHEUM RESULTS Latest Ref Rng & Units 6/16/2016 10/21/2016 10/26/2016   COMPLEMENT C3 76 - 169 mg/dL 104 - -   COMPLEMENT C4 15 - 50 mg/dL - - -   DNA-DS <10 IU/mL 4 - -   SED RATE 0 - 20 mm/h 62(H) - -   CRP, INFLAMMATION 0.0 - 8.0 mg/L <2.9 - -   CK TOTAL 30 - 225 U/L 82 - -   RHEUMATOID FACTOR <20 IU/mL <20 - -   MOISE SCREEN BY EIA - - - -   AST 0 - 45 U/L - - -   ALT 0 - 50 U/L 22 - -   ALBUMIN 3.9 - 5.1 g/dL - - -    WBC 4.0 - 11.0 10e9/L 3.5(L) 3.5(L) -   RBC 3.8 - 5.2 10e12/L 4.19 4.17 -   HGB 11.7 - 15.7 g/dL 10.2(L) 10.2(L) 10.5(L)   HCT 35.0 - 47.0 % 33.3(L) 31.9(L) -   MCV 78 - 100 fl 80 77(L) -   MCHC 31.5 - 36.5 g/dL 30.6(L) 32.0 -   RDW 10.0 - 15.0 % 15.9(H) 15.6(H) -    - 450 10e9/L 230 289 -   CREATININE 0.52 - 1.04 mg/dL 0.41(L) 0.55 -   GFR ESTIMATE, IF BLACK >60 mL/min/1.7m2 >90  African American GFR Calc   >90   GFR Calc   -   GFR ESTIMATE >60 mL/min/1.7m2 >90  Non African American GFR Calc   >90  Non  GFR Calc   -    - 1,620 mg/dL - - -   IGA 70 - 380 mg/dL - - -   IGM 60 - 265 mg/dL - - -   HEP B SURFACE JAMA 0.0 - 4.9 mIU/mL - - -   HEPATITIS C ANTIBODY NEG - - -       Rheumatoid Factor   Date Value Ref Range Status   06/16/2016 <20 <20 IU/mL Final   ,  ,  ,  ,   Scleroderma Antibody Scl-70 ERICK IgG   Date Value Ref Range Status   01/12/2017 0.2 0.0 - 0.9 AI Final     Comment:     Negative   Antibody index (AI) values reflect qualitative changes in antibody   concentration that cannot be directly associated with clinical condition or   disease state.       SSA (Ro) (ERICK) Antibody, IgG   Date Value Ref Range Status   01/12/2017 2.3 (H) 0.0 - 0.9 AI Final     Comment:     Positive   Antibody index (AI) values reflect qualitative changes in antibody   concentration that cannot be directly associated with clinical condition or   disease state.       SSB (La) (ERICK) Antibody, IgG   Date Value Ref Range Status   01/12/2017  0.0 - 0.9 AI Final    <0.2  Negative   Antibody index (AI) values reflect qualitative changes in antibody   concentration that cannot be directly associated with clinical condition or   disease state.       ,   Ribonucleic Protein IgG Antibody   Date Value Ref Range Status   05/05/2009 199 (H)  Final     Comment:     Reference range: 0 to 40  Unit: AU/mL  (Note)  REFERENCE INTERVAL: Ribonucleic Protein (ERICK), IgG   29 AU/mL or Less .............  Negative   30 - 40 AU/mL ................ Equivocal   41 AU/mL or Greater .......... Positive    RNP antibody is seen in % of mixed connective tissue  disease and is considered specific for this syndrome if  other antibodies are negative. RNP is also present in  20-30% of systemic lupus erythematosus (SLE) and 15-25%  of progressive systemic sclerosis (PSS).  Performed by [facility],  [address] [place] [place] [address] [phone]  [email or web address], Jeffery Stephens MD - Lab. Director     Dan Antibody IgG   Date Value Ref Range Status   08/08/2007 43  Final     Comment:     Reference range: 0 to 49  Unit: Units  (Note)  REFERENCE INTERVALS: SMITH (ERICK) Ab, IgG   Less than 20 Units ...... None detected   20 - 49 Units ........... Inconclusive   50 Units or greater ..... Positive    Dan antibody is very specific forsystemic lupus  erythematosus (SLE) but only occurs in 30-35% of SLE  cases. The presence of antibodies to Dan is often  associated with renal disease.  Performed by [facility],  [address] [place] [address] [phone]  [email or web address],Jeffery Stephens MD - Lab. Director     SSA (RO) Antibody IgG   Date Value Ref Range Status   05/05/2009 39  Final     Comment:     Reference range: 0 to 40  Unit: AU/mL  (Note)  REFERENCE INTERVALS: SSA (Ro) (ERICK) Ab, IgG   29 AU/mL or Less ............. Negative   30 - 40 AU/mL ................ Equivocal   41 AU/mL or Greater .......... Positive    SSA (Ro) antibody is seenin 70-75% of Sjogren syndrome  cases, 30-40% of systemic lupus erythematosus (SLE) and  5-10% of progressive systemic sclerosis (PSS).  Performed by [facility],  [address] [facility] [id number] [phone]  [email or web address], Jeffery Stephens MD - Lab. Director     SSB (LA) Antibody IgG   Date Value Ref Range Status   05/05/2009 0  Final     Comment:     Reference range: 0 to 40  Unit: AU/mL  (Note)  REFERENCE INTERVALS: SSB (La) (ERICK) Ab, IgG   29 AU/mL or Less  ............. Negative   30 - 40 AU/mL ................ Equivocal   41 AU/mL or Greater .......... Positive    SSB (La) antibody is seen in 50-60% of Sjogren syndrome  cases and is specific if it is the only ERICK antibody  present. 15-25% of patients with systemic lupus  erythematosus (SLE) and 5-10% of patients with progressive  systemic sclerosis (PSS) also have this antibody.  Performed by [facility],  [address] [place] [address] [phone]  [email or web address], Jeffery Stephens MD - Lab. Director     Scleroderma Antibody IgG   Date Value Ref Range Status   09/07/2006 6  Final     Comment:     Reference range: 0 to 49  Unit: Units  (Note)  REFERENCE INTERVALS: Scleroderma (Scl-70) (ERICK) Ab, IgG   Less than 20 Units ....... None detected   20 - 49 Units ............ Inconclusive   50 Units or greater ...... Positive    Scleroderma (Scl-70) antibody is seen in 20-60% of  patients with scleroderma and is considered diagnostic  and specific for scleroderma if it is the only ERICK  antibody present. Scl-70 is also seen in approximately  25% of progressive systemic sclerosis (PSS).  The above test was performed at: [facility],  [address] [place] [address]  [id number]  [phone]  [email or web address]   ,  ,   MOISE Screen by EIA   Date Value Ref Range Status   05/23/2006 >10.0  Final     Comment:     Interpretation:  Positive   MOISE screen results > 3 are significant and follow-up testing is recommended.   MOISE samples are retained in the Protein Lab for 30 days.  Please contact the   laboratory to request additional tests.   ,   DNA-ds   Date Value Ref Range Status   06/16/2016 4 <10 IU/mL Final     Comment:     Negative   ,  ,   Beta 2 Glycoprotein 1 Antibody IgG   Date Value Ref Range Status   01/12/2017 <0.6  Negative   <7 U/mL Final   ,   Cardiolipin Antibody IgG   Date Value Ref Range Status   01/12/2017  0.0 - 19.9 GPL-U/mL Final    <1.6  Negative   New method in use November 28, 2016.       Cardiolipin Antibody  IgM   Date Value Ref Range Status   01/12/2017 0.6 0.0 - 19.9 MPL-U/mL Final     Comment:     Negative   New method in use November 28, 2016.       ,  ,  ,  ,   Hep B Surface Agn   Date Value Ref Range Status   06/14/2006 Negative NEG Final   ,  ,  ,  ,  ,  ,  ,   Centromere Zoe IgG   Date Value Ref Range Status   05/05/2009 1  Final     Comment:     Reference range: 0 to 40  Unit: AU/mL  (Note)  TEST INFORMATION: Centromere Ab, IgG   29 AU/mL or Less ............. Negative   30 - 40 AU/mL ................ Equivocal   41 AU/mL or Greater .......... Positive    Centromere antibodies are presentin 80-90% of individuals  with  CREST variant scleroderma.  This antibody is also seen in  30% of  Raynaud patients, 12% of patients with mixed  connective-tissue  disease, diffuse scleroderma, interstitial pulmonary  fibrosis,  primary biliary cirrhosis, and in a smaller percent of  patients  with systemic lupus erythematosus (SLE) and RA.  Performed by [facility],  [address] [place] [id number] [phone]  [email or web address], Jeffery Stephens MD - Lab. Director   ,  ,  ,  ,  ,  ,  ,  ,  ,  ,  ,  ,  ,   Albumin Fraction   Date Value Ref Range Status   05/22/2014 4.0 3.7 - 5.1 g/dL Final     Alpha 2 Fraction   Date Value Ref Range Status   05/22/2014 0.8 0.5 - 0.9 g/dL Final     Beta Fraction   Date Value Ref Range Status   05/22/2014 1.2 (H) 0.6 - 1.0 g/dL Final     Gamma Fraction   Date Value Ref Range Status   05/22/2014 1.9 (H) 0.7 - 1.6 g/dL Final     Monoclonal Peak   Date Value Ref Range Status   05/22/2014 0.0 0.0 g/dL Final     ELP Interpretation:   Date Value Ref Range Status   05/22/2014   Final    Polyclonal increase in the gamma fraction and increased beta fraction.  No   monoclonal protein seen. This pattern is seen in a variety of inflammatory   disorders.  Pathologic significance requires clinical correlation.  ROSCOE Blanco M.D., Ph.D., Pathologist       ,  ,   Immunofixation ELP   Date Value Ref  Range Status   05/22/2014   Final    No monoclonal protein seen on immunofixation.  Pathological significance   requires clinical correlation.   ROSCOE Blanco M.D., Ph.D.       IGG   Date Value Ref Range Status   05/22/2014 1,790 (H) 695 - 1,620 mg/dL Final     IGA   Date Value Ref Range Status   05/22/2014 631 (H) 70 - 380 mg/dL Final     IGM   Date Value Ref Range Status   05/22/2014 202 60 - 265 mg/dL Final   ,  ,  ,  ,   Dan ERICK Antibody IgG   Date Value Ref Range Status   01/12/2017 0.2 0.0 - 0.9 AI Final     Comment:     Negative   Antibody index (AI) values reflect qualitative changes in antibody   concentration that cannot be directly associated with clinical condition or   disease state.       ,   Scleroderma Antibody Scl-70 ERICK IgG   Date Value Ref Range Status   01/12/2017 0.2 0.0 - 0.9 AI Final     Comment:     Negative   Antibody index (AI) values reflect qualitative changes in antibody   concentration that cannot be directly associated with clinical condition or   disease state.       ,  ,        Scribe Disclosure:   I, Faviola Gracia, am serving as a scribe to document services personally performed by Efrain Mckinney MD at this visit, based upon the provider's statements to me. All documentation has been reviewed by the aforementioned provider prior to being entered into the official medical record.     Portions of this medical record were completed by a scribe. UPON MY REVIEW AND AUTHENTICATION BY ELECTRONIC SIGNATURE, this confirms (a) I performed the applicable clinical services, and (b) the record is accurate.

## 2019-03-14 NOTE — NURSING NOTE
Chief Complaint   Patient presents with     RECHECK     Follow up visit with Stephanie concerning her Scleroderma     Eric King CMA at 7:17 AM on 3/14/2019

## 2019-10-02 ENCOUNTER — HEALTH MAINTENANCE LETTER (OUTPATIENT)
Age: 49
End: 2019-10-02

## 2019-12-06 ENCOUNTER — OFFICE VISIT (OUTPATIENT)
Dept: URGENT CARE | Facility: URGENT CARE | Age: 49
End: 2019-12-06

## 2019-12-06 VITALS
TEMPERATURE: 97.8 F | WEIGHT: 130 LBS | OXYGEN SATURATION: 100 % | BODY MASS INDEX: 26.26 KG/M2 | HEART RATE: 68 BPM | DIASTOLIC BLOOD PRESSURE: 74 MMHG | SYSTOLIC BLOOD PRESSURE: 119 MMHG

## 2019-12-06 DIAGNOSIS — R59.1 LYMPHADENOPATHY: Primary | ICD-10-CM

## 2019-12-06 LAB
ERYTHROCYTE [DISTWIDTH] IN BLOOD BY AUTOMATED COUNT: 13.1 % (ref 10–15)
HCT VFR BLD AUTO: 34.5 % (ref 35–47)
HETEROPH AB SER QL: NEGATIVE
HGB BLD-MCNC: 11.3 G/DL (ref 11.7–15.7)
MCH RBC QN AUTO: 27.2 PG (ref 26.5–33)
MCHC RBC AUTO-ENTMCNC: 32.8 G/DL (ref 31.5–36.5)
MCV RBC AUTO: 83 FL (ref 78–100)
PLATELET # BLD AUTO: 205 10E9/L (ref 150–450)
RBC # BLD AUTO: 4.16 10E12/L (ref 3.8–5.2)
WBC # BLD AUTO: 3.7 10E9/L (ref 4–11)

## 2019-12-06 PROCEDURE — 85027 COMPLETE CBC AUTOMATED: CPT | Performed by: FAMILY MEDICINE

## 2019-12-06 PROCEDURE — 86308 HETEROPHILE ANTIBODY SCREEN: CPT | Performed by: FAMILY MEDICINE

## 2019-12-06 PROCEDURE — 99202 OFFICE O/P NEW SF 15 MIN: CPT | Performed by: FAMILY MEDICINE

## 2019-12-06 PROCEDURE — 36415 COLL VENOUS BLD VENIPUNCTURE: CPT | Performed by: FAMILY MEDICINE

## 2019-12-07 NOTE — PROGRESS NOTES
Subjective     Stephanie Reed is a 49 year old female who presents to clinic today for the following health issues:    HPI   Chief Complaint   Patient presents with     Urgent Care     Pt states left side lump by ear, swelling 3x days        Duration: 3 days     Description (location/character/radiation): swollen gland under left ear,mildyl painful    Intensity:  moderate    Accompanying signs and symptoms: no fever, chills, sob    History (similar episodes/previous evaluation): had cold 3 weeks ago, SLE, MCTD    Precipitating or alleviating factors: None    Therapies tried and outcome: none         Patient Active Problem List   Diagnosis     Scleroderma (H)     Seasonal allergic rhinitis     Mixed connective tissue disease (H)     Vitamin D deficiency     Systemic lupus erythematosus (H)     Sicca syndrome (H)     Atypical chest pain     ERNST (dyspnea on exertion)     Raynaud's phenomenon without gangrene     Past Surgical History:   Procedure Laterality Date     TUBAL LIGATION         Social History     Tobacco Use     Smoking status: Never Smoker     Smokeless tobacco: Never Used     Tobacco comment:  smokes outside only and not in the car    Substance Use Topics     Alcohol use: No     Family History   Adopted: Yes   Problem Relation Age of Onset     Unknown/Adopted Other      Glaucoma No family hx of      Macular Degeneration No family hx of          Current Outpatient Medications   Medication Sig Dispense Refill     amoxicillin-clavulanate (AUGMENTIN) 875-125 MG tablet Take 1 tablet by mouth 2 times daily for 10 days 20 tablet 0     artificial tears SOLN Instill one drop twice daily       diclofenac (VOLTAREN) 1 % GEL topical gel Apply 2 g topically 4 times daily as needed for moderate pain (Patient not taking: Reported on 12/6/2019) 1 Tube 11     hydroxychloroquine (PLAQUENIL) 200 MG tablet Take 1 tablet (200 mg) by mouth daily (with dinner) Annual eye exams for monitoring. (Patient not taking: Reported on  12/6/2019) 90 tablet 3     loratadine (CLARITIN) 10 MG tablet Take 1 tablet (10 mg) by mouth daily (Patient not taking: Reported on 12/6/2019) 30 tablet 11     No Known Allergies  Recent Labs   Lab Test 08/16/17  1143 08/16/17  0908 10/26/16  0940 10/21/16  2130 06/16/16  0858  04/10/14  0928  12/22/11  1030   LDL  --  112  --   --   --   --  103  --   --    HDL  --  37.5*  --   --   --   --  34*  --   --    TRIG  --  108.4  --   --   --   --  133  --   --    ALT  --   --   --   --  22  --  23  --  22   CR  --   --   --  0.55 0.41*   < > 0.51*   < > 0.72   GFRESTIMATED  --   --   --  >90  Non  GFR Calc   >90  Non  GFR Calc     < > >90   < > 89   GFRESTBLACK  --   --   --  >90   GFR Calc   >90   GFR Calc     < > >90   < > >90   POTASSIUM  --   --   --  3.4  --   --  4.5   < >  --    TSH 1.20  --  1.56  --   --   --   --   --  1.09    < > = values in this interval not displayed.      BP Readings from Last 3 Encounters:   12/06/19 119/74   03/14/19 123/75   10/15/18 125/90    Wt Readings from Last 3 Encounters:   12/06/19 59 kg (130 lb)   03/14/19 59 kg (130 lb 1.6 oz)   10/15/18 59.9 kg (132 lb)                 Reviewed and updated as needed this visit by Provider         Review of Systems   ROS COMP: Constitutional, HEENT, cardiovascular, pulmonary, gi and gu systems are negative, except as otherwise noted.      Objective    /74   Pulse 68   Temp 97.8  F (36.6  C) (Oral)   Wt 59 kg (130 lb)   SpO2 100%   BMI 26.26 kg/m    Body mass index is 26.26 kg/m .  Physical Exam   GENERAL: alert and no distress  EYES: Eyes grossly normal to inspection, PERRL and conjunctivae and sclerae normal  HENT: normal cephalic/atraumatic, ear canals and TM's normal, nose and mouth without ulcers or lesions, oropharynx clear and oral mucous membranes moist  NECK: left-sided jugulodigastric lymphadenopathy, about 2 cm in size, tender on palpation, no significant skin  discoloration or warmth noted  RESP: lungs clear to auscultation - no rales, rhonchi or wheezes  CV: regular rate and rhythm, normal S1 S2, no S3 or S4, no murmur, click or rub, no peripheral edema and peripheral pulses strong  ABDOMEN: soft, nontender, no hepatosplenomegaly, no masses and bowel sounds normal  MS: no gross musculoskeletal defects noted, no edema    Results for orders placed or performed in visit on 12/06/19   CBC with platelets     Status: Abnormal   Result Value Ref Range    WBC 3.7 (L) 4.0 - 11.0 10e9/L    RBC Count 4.16 3.8 - 5.2 10e12/L    Hemoglobin 11.3 (L) 11.7 - 15.7 g/dL    Hematocrit 34.5 (L) 35.0 - 47.0 %    MCV 83 78 - 100 fl    MCH 27.2 26.5 - 33.0 pg    MCHC 32.8 31.5 - 36.5 g/dL    RDW 13.1 10.0 - 15.0 %    Platelet Count 205 150 - 450 10e9/L   Mononucleosis screen     Status: None   Result Value Ref Range    Mononucleosis Screen Negative NEG^Negative       Assessment & Plan     (R59.1) Lymphadenopathy  (primary encounter diagnosis)  Comment: Symptoms are likely secondary to lymphadenopathy.  Treatment options discussed.  Shared decision made to try antibiotic.  Augmentin prescribed, common side effects discussed.  Suggested warm compresses, over-the-counter analgesia.  Follow-up if symptoms persist or worsen.  Patient understood and in agreement with above plan.  All questions answered.  Plan: amoxicillin-clavulanate (AUGMENTIN) 875-125 MG         tablet, CBC with platelets, Mononucleosis         screen             Patient Instructions       Patient Education     Lymphadenopathy  Lymphadenopathy is swelling of the lymph nodes. Lymph nodes are small, bean-shaped glands around the body.  What are lymph nodes?  Lymph nodes are part of your immune system. These glands are found in your neck, over your clavicle, armpits, groin, chest, and abdomen. They act as filters for lymph fluid as it flows through your body. Lymph fluid contains white blood cells (lymphocytes) that help the body  fight infection and disease.   Why lymph nodes swell  Lymphadenopathy is very common. The glands often enlarge during a viral or bacterial infection. It can happen during a cold, the flu, or strep throat. The nodes may swell in just one area of the body, such as the neck (localized). Or nodes may swell all over the body (generalized). The neck (cervical) lymph nodes are the most common site of lymphadenopathy.  What causes lymphadenopathy?  Dead cells and fluid build up in the lymph nodes as they help fight infection or disease. This causes them to swell in size. Enlarged lymph nodes are often near the source of infection. This can help to find the cause of an infection. For example, swollen lymph nodes around the jaw may be because of an infection in the teeth or mouth. But lymphadenopathy may also be generalized. This is common in some viral illnesses such as infectious mononucleosis or chickenpox (varicella).  Lymphadenopathy can also be caused by:    Infection of a lymph node or small group of nodes (lymphadenitis)    Cancer    Reactions to medicines such as antibiotics and certain blood pressure, gout, and seizure medicines    Other health conditions, such as HIV infection, lupus, or sarcoidosis  Symptoms of lymphadenopathy  Lymphadenopathy can cause symptoms such as:    Lumps under the jaw, on the sides or back of the neck, in the armpits, in the groin, or in the chest or belly (abdomen)    Pain or tenderness in any of these areas    Redness or warmth in any of these areas  You may also have symptoms from an infection causing the swollen glands. These symptoms may include fever, sore throat, body aches, or cough.  Diagnosing lymphadenopathy  Your healthcare provider will ask about your health history and symptoms. He or she will give you a physical exam and check the areas where lymph nodes are enlarged. Your healthcare provider will check the size and location of the nodes, and ask how long they have been  swollen and if they are painful. Diagnostic tests and referral to specialists may be recommended. They may include:    Blood tests. These are done to check for signs of infection and other problems.    Urine test. This is also done to check for infection and other problems.    Chest X-ray, ultrasound, CT scan, or MRI scans. These tests can show enlarged lymph nodes or other problems.    Lymph node biopsy. If lymph nodes are swollen for 3 to 4 weeks, they may be checked with a biopsy. Small samples of lymph node tissue are taken and checked in a lab for signs of cancer. You may be referred to a specialist in blood disorders and cancer (hematologist and oncologist).  Treatment for lymphadenopathy  The treatment of enlarged lymph nodes depends on the cause. Enlarged lymph nodes are often harmless and go away without any treatment. Treatment is most often done on the cause of the enlarged nodes and may include:    Antibiotic medicine to treat a bacterial infection    Incision and drainage of a lymph node for lymphadenitis    Other medicines or procedures to treat the cause of the enlarged nodes  You may need follow-up exam in 3 to 4 weeks to recheck enlarged nodes.     When to call your healthcare provider  Call your healthcare provider if you have lymph nodes that are still swollen after 3 to 4 weeks, or as directed by your healthcare provider.   Date Last Reviewed: 5/1/2017 2000-2018 [employer]. [address] [place] [address], [place] [address]. All rights reserved. This information is not intended as a substitute for professional medical care. Always follow your healthcare professional's instructions.               Nate Anderson MD  [facility]

## 2019-12-07 NOTE — PATIENT INSTRUCTIONS
Patient Education     Lymphadenopathy  Lymphadenopathy is swelling of the lymph nodes. Lymph nodes are small, bean-shaped glands around the body.  What are lymph nodes?  Lymph nodes are part of your immune system. These glands are found in your neck, over your clavicle, armpits, groin, chest, and abdomen. They act as filters for lymph fluid as it flows through your body. Lymph fluid contains white blood cells (lymphocytes) that help the body fight infection and disease.   Why lymph nodes swell  Lymphadenopathy is very common. The glands often enlarge during a viral or bacterial infection. It can happen during a cold, the flu, or strep throat. The nodes may swell in just one area of the body, such as the neck (localized). Or nodes may swell all over the body (generalized). The neck (cervical) lymph nodes are the most common site of lymphadenopathy.  What causes lymphadenopathy?  Dead cells and fluid build up in the lymph nodes as they help fight infection or disease. This causes them to swell in size. Enlarged lymph nodes are often near the source of infection. This can help to find the cause of an infection. For example, swollen lymph nodes around the jaw may be because of an infection in the teeth or mouth. But lymphadenopathy may also be generalized. This is common in some viral illnesses such as infectious mononucleosis or chickenpox (varicella).  Lymphadenopathy can also be caused by:    Infection of a lymph node or small group of nodes (lymphadenitis)    Cancer    Reactions to medicines such as antibiotics and certain blood pressure, gout, and seizure medicines    Other health conditions, such as HIV infection, lupus, or sarcoidosis  Symptoms of lymphadenopathy  Lymphadenopathy can cause symptoms such as:    Lumps under the jaw, on the sides or back of the neck, in the armpits, in the groin, or in the chest or belly (abdomen)    Pain or tenderness in any of these areas    Redness or warmth in any of these  areas  You may also have symptoms from an infection causing the swollen glands. These symptoms may include fever, sore throat, body aches, or cough.  Diagnosing lymphadenopathy  Your healthcare provider will ask about your health history and symptoms. He or she will give you a physical exam and check the areas where lymph nodes are enlarged. Your healthcare provider will check the size and location of the nodes, and ask how long they have been swollen and if they are painful. Diagnostic tests and referral to specialists may be recommended. They may include:    Blood tests. These are done to check for signs of infection and other problems.    Urine test. This is also done to check for infection and other problems.    Chest X-ray, ultrasound, CT scan, or MRI scans. These tests can show enlarged lymph nodes or other problems.    Lymph node biopsy. If lymph nodes are swollen for 3 to 4 weeks, they may be checked with a biopsy. Small samples of lymph node tissue are taken and checked in a lab for signs of cancer. You may be referred to a specialist in blood disorders and cancer (hematologist and oncologist).  Treatment for lymphadenopathy  The treatment of enlarged lymph nodes depends on the cause. Enlarged lymph nodes are often harmless and go away without any treatment. Treatment is most often done on the cause of the enlarged nodes and may include:    Antibiotic medicine to treat a bacterial infection    Incision and drainage of a lymph node for lymphadenitis    Other medicines or procedures to treat the cause of the enlarged nodes  You may need follow-up exam in 3 to 4 weeks to recheck enlarged nodes.     When to call your healthcare provider  Call your healthcare provider if you have lymph nodes that are still swollen after 3 to 4 weeks, or as directed by your healthcare provider.   Date Last Reviewed: 5/1/2017 2000-2018 [employer]. [address] [place] [address], [place] [address]. All rights reserved.  This information is not intended as a substitute for professional medical care. Always follow your healthcare professional's instructions.

## 2020-08-25 ENCOUNTER — OFFICE VISIT (OUTPATIENT)
Dept: URGENT CARE | Facility: URGENT CARE | Age: 50
End: 2020-08-25
Payer: COMMERCIAL

## 2020-08-25 VITALS
BODY MASS INDEX: 30.55 KG/M2 | TEMPERATURE: 98.4 F | SYSTOLIC BLOOD PRESSURE: 106 MMHG | WEIGHT: 132 LBS | DIASTOLIC BLOOD PRESSURE: 71 MMHG | HEIGHT: 55 IN | HEART RATE: 73 BPM | OXYGEN SATURATION: 99 %

## 2020-08-25 DIAGNOSIS — M32.9 SYSTEMIC LUPUS ERYTHEMATOSUS, UNSPECIFIED SLE TYPE, UNSPECIFIED ORGAN INVOLVEMENT STATUS (H): ICD-10-CM

## 2020-08-25 DIAGNOSIS — R42 DIZZINESS: Primary | ICD-10-CM

## 2020-08-25 LAB
ALBUMIN SERPL-MCNC: 3.6 G/DL (ref 3.4–5)
ALP SERPL-CCNC: 68 U/L (ref 40–150)
ALT SERPL W P-5'-P-CCNC: 37 U/L (ref 0–50)
ANION GAP SERPL CALCULATED.3IONS-SCNC: 4 MMOL/L (ref 3–14)
AST SERPL W P-5'-P-CCNC: 22 U/L (ref 0–45)
BASOPHILS # BLD AUTO: 0 10E9/L (ref 0–0.2)
BASOPHILS NFR BLD AUTO: 0.3 %
BILIRUB SERPL-MCNC: 0.2 MG/DL (ref 0.2–1.3)
BUN SERPL-MCNC: 8 MG/DL (ref 7–30)
CALCIUM SERPL-MCNC: 8.8 MG/DL (ref 8.5–10.1)
CHLORIDE SERPL-SCNC: 109 MMOL/L (ref 94–109)
CO2 SERPL-SCNC: 27 MMOL/L (ref 20–32)
CREAT SERPL-MCNC: 0.55 MG/DL (ref 0.52–1.04)
DIFFERENTIAL METHOD BLD: ABNORMAL
EOSINOPHIL # BLD AUTO: 0.1 10E9/L (ref 0–0.7)
EOSINOPHIL NFR BLD AUTO: 3.5 %
ERYTHROCYTE [DISTWIDTH] IN BLOOD BY AUTOMATED COUNT: 12.3 % (ref 10–15)
GFR SERPL CREATININE-BSD FRML MDRD: >90 ML/MIN/{1.73_M2}
GLUCOSE SERPL-MCNC: 124 MG/DL (ref 70–99)
HCT VFR BLD AUTO: 38.7 % (ref 35–47)
HGB BLD-MCNC: 12.7 G/DL (ref 11.7–15.7)
LYMPHOCYTES # BLD AUTO: 1.1 10E9/L (ref 0.8–5.3)
LYMPHOCYTES NFR BLD AUTO: 33.6 %
MCH RBC QN AUTO: 27.9 PG (ref 26.5–33)
MCHC RBC AUTO-ENTMCNC: 32.8 G/DL (ref 31.5–36.5)
MCV RBC AUTO: 85 FL (ref 78–100)
MONOCYTES # BLD AUTO: 0.5 10E9/L (ref 0–1.3)
MONOCYTES NFR BLD AUTO: 14.2 %
NEUTROPHILS # BLD AUTO: 1.6 10E9/L (ref 1.6–8.3)
NEUTROPHILS NFR BLD AUTO: 48.4 %
PLATELET # BLD AUTO: 231 10E9/L (ref 150–450)
POTASSIUM SERPL-SCNC: 3.7 MMOL/L (ref 3.4–5.3)
PROT SERPL-MCNC: 8.5 G/DL (ref 6.8–8.8)
RBC # BLD AUTO: 4.56 10E12/L (ref 3.8–5.2)
SODIUM SERPL-SCNC: 140 MMOL/L (ref 133–144)
WBC # BLD AUTO: 3.4 10E9/L (ref 4–11)

## 2020-08-25 PROCEDURE — 36415 COLL VENOUS BLD VENIPUNCTURE: CPT | Performed by: FAMILY MEDICINE

## 2020-08-25 PROCEDURE — 80053 COMPREHEN METABOLIC PANEL: CPT | Performed by: FAMILY MEDICINE

## 2020-08-25 PROCEDURE — 99213 OFFICE O/P EST LOW 20 MIN: CPT | Performed by: FAMILY MEDICINE

## 2020-08-25 PROCEDURE — 85025 COMPLETE CBC W/AUTO DIFF WBC: CPT | Performed by: FAMILY MEDICINE

## 2020-08-25 RX ORDER — MECLIZINE HYDROCHLORIDE 25 MG/1
12.5 TABLET ORAL 4 TIMES DAILY PRN
Qty: 20 TABLET | Refills: 1 | Status: SHIPPED | OUTPATIENT
Start: 2020-08-25 | End: 2021-02-08

## 2020-08-25 ASSESSMENT — MIFFLIN-ST. JEOR: SCORE: 1055.88

## 2020-08-25 NOTE — PROGRESS NOTES
"CHIEF COMPLAINT    Dizziness for 3 days.      HISTORY    Patient states that she worked hard about 3 days ago.  After lying down, she felt dizzy when she sat up.  She may have also been having some vertigo.  No vomiting.  No fever.  No unusual weakness.  She does feel a bit off balance.    She feels she is improving slightly.  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    She does have a history of some significant medical problems.  She is not currently on any treatments.  She has not seen her rheumatologist recently.      Patient Active Problem List   Diagnosis     Scleroderma (H)     Seasonal allergic rhinitis     Mixed connective tissue disease (H)     Vitamin D deficiency     Systemic lupus erythematosus (H)     Sicca syndrome (H)     Atypical chest pain     ERNST (dyspnea on exertion)     Raynaud's phenomenon without gangrene       REVIEW OF SYSTEMS    No fever.  Weight stable.  No sore throat or head congestion.  No cough or S OB.  No heart palpitations.  No headache.  No visual disturbances or speech problems.      Past Medical History:   Diagnosis Date     Carpal tunnel syndrome      Dry eye      Heartburn      Iron deficiency anemia      Long-term use of Plaquenil      Menorrhagia     with anemia     Nephrolithiasis          EXAM  /71   Pulse 73   Temp 98.4  F (36.9  C) (Tympanic)   Ht 1.397 m (4' 7\")   Wt 59.9 kg (132 lb)   SpO2 99%   BMI 30.68 kg/m      She appears well-nourished and alert.  NAD.   eye exam: PERRLA, EOMI, I did not appreciate any nystagmus.  Face: Symmetrical.  Neck: No thyromegaly.  Cardiac: RSR with normal rate.  No murmurs.  Chest: Clear.  Neuro: Motor and fine motor normal Romberg negative gait normal      (R42) Dizziness  (primary encounter diagnosis)  Comment:     Possible inner ear disturbance.  Exam unremarkable today.  Symptomatic treatment begun.    Plan: meclizine (ANTIVERT) 25 MG tablet,         Comprehensive metabolic panel (BMP + Alb, Alk         Phos, " ALT, AST, Total. Bili, TP), CBC with         platelets and differential, CANCELED:         Comprehensive metabolic panel (BMP + Alb, Alk         Phos, ALT, AST, Total. Bili, TP), CANCELED: CBC        with platelets and differential          Patient advised to have follow-up if not improving.      (M32.9) Systemic lupus erythematosus, unspecified SLE type, unspecified organ involvement status (H)  Comment:     Plan: Comprehensive metabolic panel (BMP + Alb, Alk         Phos, ALT, AST, Total. Bili, TP), CBC with         platelets and differential, CANCELED:         Comprehensive metabolic panel (BMP + Alb, Alk         Phos, ALT, AST, Total. Bili, TP), CANCELED: CBC        with platelets and differential          Some basic labs were ordered.  I did ask her to make a follow-up appointment with her rheumatologist.  She voices understanding of this.

## 2020-08-25 NOTE — PATIENT INSTRUCTIONS
Take prescribed medication as directed.    Return to this clinic if not improving.    Make appointment with Dr Mckinney.    You could call [address] [place] [address]

## 2021-01-15 ENCOUNTER — HEALTH MAINTENANCE LETTER (OUTPATIENT)
Age: 51
End: 2021-01-15

## 2021-01-24 ENCOUNTER — HEALTH MAINTENANCE LETTER (OUTPATIENT)
Age: 51
End: 2021-01-24

## 2021-02-08 ENCOUNTER — OFFICE VISIT (OUTPATIENT)
Dept: FAMILY MEDICINE | Facility: CLINIC | Age: 51
End: 2021-02-08
Payer: COMMERCIAL

## 2021-02-08 VITALS
OXYGEN SATURATION: 99 % | BODY MASS INDEX: 28.75 KG/M2 | DIASTOLIC BLOOD PRESSURE: 87 MMHG | HEART RATE: 86 BPM | HEIGHT: 59 IN | SYSTOLIC BLOOD PRESSURE: 142 MMHG | TEMPERATURE: 98.2 F | WEIGHT: 142.6 LBS | RESPIRATION RATE: 16 BRPM

## 2021-02-08 DIAGNOSIS — Z23 NEED FOR PROPHYLACTIC VACCINATION AND INOCULATION AGAINST INFLUENZA: ICD-10-CM

## 2021-02-08 DIAGNOSIS — R12 HEARTBURN: ICD-10-CM

## 2021-02-08 DIAGNOSIS — Z00.00 ROUTINE GENERAL MEDICAL EXAMINATION AT A HEALTH CARE FACILITY: Primary | ICD-10-CM

## 2021-02-08 DIAGNOSIS — R03.0 ELEVATED BLOOD PRESSURE READING WITHOUT DIAGNOSIS OF HYPERTENSION: ICD-10-CM

## 2021-02-08 DIAGNOSIS — N91.2 AMENORRHEA: ICD-10-CM

## 2021-02-08 DIAGNOSIS — Z13.220 SCREENING FOR CHOLESTEROL LEVEL: ICD-10-CM

## 2021-02-08 DIAGNOSIS — Z12.11 SPECIAL SCREENING FOR MALIGNANT NEOPLASMS, COLON: ICD-10-CM

## 2021-02-08 DIAGNOSIS — Z12.31 ENCOUNTER FOR SCREENING MAMMOGRAM FOR BREAST CANCER: ICD-10-CM

## 2021-02-08 LAB
BUN SERPL-MCNC: 11.1 MG/DL (ref 7–19)
CALCIUM SERPL-MCNC: 9.6 MG/DL (ref 8.5–10.1)
CHLORIDE SERPLBLD-SCNC: 99.3 MMOL/L (ref 98–110)
CHOLEST SERPL-MCNC: 180.7 MG/DL (ref 0–200)
CHOLEST/HDLC SERPL: 5.7 {RATIO} (ref 0–5)
CO2 SERPL-SCNC: 24.8 MMOL/L (ref 20–32)
CREAT SERPL-MCNC: 0.5 MG/DL (ref 0.5–1)
FSH SERPL-ACNC: 61 IU/L
GFR SERPL CREATININE-BSD FRML MDRD: >90 ML/MIN/1.7 M2
GLUCOSE SERPL-MCNC: 117.5 MG'DL (ref 70–99)
HBA1C MFR BLD: 6.1 % (ref 4.1–5.7)
HDLC SERPL-MCNC: 32 MG/DL
HEMOGLOBIN: 11.8 G/DL (ref 11.7–15.7)
LDLC SERPL CALC-MCNC: 126 MG/DL (ref 0–129)
LH SERPL-ACNC: 34 IU/L
POTASSIUM SERPL-SCNC: 3.6 MMOL/L (ref 3.3–4.5)
SODIUM SERPL-SCNC: 137.2 MMOL/L (ref 132.6–141.4)
TRIGL SERPL-MCNC: 113.5 MG/DL (ref 0–150)
TSH SERPL DL<=0.005 MIU/L-ACNC: 1.49 MU/L (ref 0.4–4)
VLDL CHOLESTEROL: 22.7 MG/DL (ref 7–32)

## 2021-02-08 PROCEDURE — 90471 IMMUNIZATION ADMIN: CPT | Performed by: FAMILY MEDICINE

## 2021-02-08 PROCEDURE — 83036 HEMOGLOBIN GLYCOSYLATED A1C: CPT | Performed by: FAMILY MEDICINE

## 2021-02-08 PROCEDURE — 90682 RIV4 VACC RECOMBINANT DNA IM: CPT | Performed by: FAMILY MEDICINE

## 2021-02-08 PROCEDURE — 83002 ASSAY OF GONADOTROPIN (LH): CPT | Performed by: FAMILY MEDICINE

## 2021-02-08 PROCEDURE — 83001 ASSAY OF GONADOTROPIN (FSH): CPT | Performed by: FAMILY MEDICINE

## 2021-02-08 PROCEDURE — 36415 COLL VENOUS BLD VENIPUNCTURE: CPT | Performed by: FAMILY MEDICINE

## 2021-02-08 PROCEDURE — 80061 LIPID PANEL: CPT | Performed by: FAMILY MEDICINE

## 2021-02-08 PROCEDURE — 80048 BASIC METABOLIC PNL TOTAL CA: CPT | Performed by: FAMILY MEDICINE

## 2021-02-08 PROCEDURE — 99396 PREV VISIT EST AGE 40-64: CPT | Mod: 25 | Performed by: FAMILY MEDICINE

## 2021-02-08 PROCEDURE — 85018 HEMOGLOBIN: CPT | Performed by: FAMILY MEDICINE

## 2021-02-08 PROCEDURE — 84443 ASSAY THYROID STIM HORMONE: CPT | Performed by: FAMILY MEDICINE

## 2021-02-08 RX ORDER — CHOLECALCIFEROL (VITAMIN D3) 50 MCG
1 TABLET ORAL DAILY
Qty: 90 TABLET | Refills: 3 | Status: SHIPPED | OUTPATIENT
Start: 2021-02-08 | End: 2024-01-17

## 2021-02-08 RX ORDER — FAMOTIDINE 40 MG/1
40 TABLET, FILM COATED ORAL DAILY
Qty: 90 TABLET | Refills: 3 | Status: SHIPPED | OUTPATIENT
Start: 2021-02-08 | End: 2023-07-03

## 2021-02-08 ASSESSMENT — MIFFLIN-ST. JEOR: SCORE: 1159.52

## 2021-02-08 NOTE — LETTER
March 18, 2021      Stephanie Reed  [address]  [facility] [address]        Dear Stephanie,    Thank you for getting your care at [facility]. Please see below for your test results.  These look good - and it does appear that you have entered menopause (based on the levels of FSH and LH noted).    Resulted Orders   Lipid [facility] ([facility])   Result Value Ref Range    Cholesterol 180.7 0.0 - 200.0 mg/dL    Cholesterol/HDL Ratio 5.7 (H) 0.0 - 5.0    HDL Cholesterol 32.0 (L) >40.0 mg/dL    Triglycerides 113.5 0.0 - 150.0 mg/dL    VLDL Cholesterol 22.7 7.0 - 32.0 mg/dL    LDL Cholesterol Calculated 126 0 - 129 mg/dL   TSH with free T4 reflex   Result Value Ref Range    TSH 1.49 0.40 - 4.00 mU/L   Hemoglobin (HGB) ([facility])   Result Value Ref Range    Hemoglobin 11.8 11.7 - 15.7 g/dL   Hemoglobin A1c ([facility])   Result Value Ref Range    Hemoglobin A1C 6.1 (H) 4.1 - 5.7 %   Basic Metabolic Panel ([facility])   Result Value Ref Range    Calcium 9.6 8.5 - 10.1 mg/dL    Chloride 99.3 98.0 - 110.0 mmol/L    Carbon Dioxide 24.8 20.0 - 32.0 mmol/L    Creatinine 0.5 (L) 0.5 - 1.0 mg/dL    Glucose 117.5 (H) 70.0 - 99.0 mg'dL    Potassium 3.6 3.3 - 4.5 mmol/L    Sodium 137.2 132.6 - 141.4 mmol/L    GFR Estimate >90 >60.0 mL/min/1.7 m2    GFR Estimate If Black >90 >60.0 mL/min/1.7 m2    Urea Nitrogen 11.1 7.0 - 19.0 mg/dL   Follicle stimulating hormone   Result Value Ref Range    FSH 61.0 IU/L      Comment:      FSH Reference Range  Female: Follicular      2.5-10.2          Mid-cycle       3.4-33.4          Luteal          1.5-9.1          Postmenopausal  23.0-116.3     Lutropin   Result Value Ref Range    Lutropin 34.0 IU/L      Comment:      LH Reference Range  Female: Follicular      1.9-12.5          Mid-cycle       8.7-76.3          Luteal          0.5-16.9          Postmenopausal  15.9-54.0         If you have any concerns about these results please call and leave a message for me or send a [employer] message to the  clinic.    Sincerely,    Tori Severino MD

## 2021-02-08 NOTE — LETTER
Date:March 19, 2021      Patient was self referred, no letter generated. Do not send.        [facility] Health Information

## 2021-02-08 NOTE — PROGRESS NOTES
Female Physical Note          HPI       Concerns today:   3 yrs ago - heavy menses, took 10 days medroxyprogesterone  Had ablation?  No periods since then    BP slightly elevated today  10 lb weight gain in past year  Some occasional dizziness  No vision changes  Mild HA    Exercise - has a stationary bike at home    Patient Active Problem List   Diagnosis     Scleroderma (H)     Seasonal allergic rhinitis     Mixed connective tissue disease (H)     Vitamin D deficiency     Systemic lupus erythematosus (H)     Sicca syndrome (H)     Atypical chest pain     ERNST (dyspnea on exertion)     Raynaud's phenomenon without gangrene       Past Medical History:   Diagnosis Date     Carpal tunnel syndrome      Dry eye      Heartburn      Iron deficiency anemia      Long-term use of Plaquenil      Menorrhagia     with anemia     Nephrolithiasis        Family History   Adopted: Yes   Problem Relation Age of Onset     Unknown/Adopted Other      Glaucoma No family hx of      Macular Degeneration No family hx of               Review of Systems:     Review of Systems:  CONSTITUTIONAL: NEGATIVE for fever, chills, change in weight  INTEGUMENTARY/SKIN: NEGATIVE for worrisome rashes, moles or lesions  EYES: NEGATIVE for vision changes or irritation  ENT/MOUTH: mild congestion  RESP: NEGATIVE for significant cough or SOB  BREAST: NEGATIVE for masses, tenderness or discharge  CV: NEGATIVE for chest pain, palpitations or peripheral edema  GI: +heartburn, on omeprazole 20mg (which helps), no constipation  : NEGATIVE for frequency, dysuria, or hematuria  MUSCULOSKELETAL: NEGATIVE for significant arthralgias or myalgia  NEURO: NEGATIVE for weakness, +dizziness  ENDOCRINE: NEGATIVE for temperature intolerance, skin/hair changes  HEME/ALLERGY: NEGATIVE for bleeding problems  PSYCHIATRIC: NEGATIVE for changes in mood or affect  Sleep:   Do you snore most or the night (as reported by a family member)? No  Do you feel sleepy or extremely tired  during most of the day? No           Social History     Social History     Socioeconomic History     Marital status:      Spouse name: Not on file     Number of children: Not on file     Years of education: Not on file     Highest education level: Not on file   Occupational History     Not on file   Social Needs     Financial resource strain: Not on file     Food insecurity     Worry: Not on file     Inability: Not on file     Transportation needs     Medical: Not on file     Non-medical: Not on file   Tobacco Use     Smoking status: Never Smoker     Smokeless tobacco: Never Used     Tobacco comment:  smokes outside only and not in the car    Substance and Sexual Activity     Alcohol use: No     Drug use: No     Sexual activity: Yes     Partners: Male   Lifestyle     Physical activity     Days per week: Not on file     Minutes per session: Not on file     Stress: Not on file   Relationships     Social connections     Talks on phone: Not on file     Gets together: Not on file     Attends Yarsani service: Not on file     Active member of club or organization: Not on file     Attends meetings of clubs or organizations: Not on file     Relationship status: Not on file     Intimate partner violence     Fear of current or ex partner: Not on file     Emotionally abused: Not on file     Physically abused: Not on file     Forced sexual activity: Not on file   Other Topics Concern     Not on file   Social History Narrative    Immigrant; From [place] in 1991    Marital History - Currently ; lives with  and 3 kids.    Native Language [place]    Occupation:;  at [employer].       Marital Status:   Who lives in your household? Lives with partner, kids; works in a salon    Has anyone hurt you physically, for example by pushing, hitting, slapping or kicking you or forcing you to have sex? Denies  Do you feel threatened or controlled by a partner, ex-partner or anyone in your life?  "Denies    Sexual Health     Sexual concerns: No   STI History: Neg  Pregnancy History:   LMP No LMP recorded. (Menstrual status: Irregular Periods). no menses x 3 years  Last Pap Smear Date:   Lab Results   Component Value Date    PAP NIL 2017    PAP NIL 04/10/2014    PAP NIL 2011     Abnormal Pap History: None    Recommended Screening     Cholesterol Level (>44 yo or at risk):  Recommended and patient accepted testing., Pap/HPV cotest every 5 years for women 30-65   Testing not indicated  and HIV screening:  Testing not indicated   Colon CA Screening (>50-75 ):  Recommended and patient accepted testing. and Breast CA Screening (>41 yo or 10 y before 1st degree relative diagnosis): Recommended and patient accepted testing.           Physical Exam:     Vitals: BP (!) 142/87   Pulse 86   Temp 98.2  F (36.8  C) (Oral)   Resp 16   Ht 1.486 m (4' 10.5\")   Wt 64.7 kg (142 lb 9.6 oz)   SpO2 99%   BMI 29.30 kg/m    BMI= Body mass index is 29.3 kg/m .   GENERAL: healthy, alert and no distress  EYES: Eyes grossly normal to inspection, extraocular movements - intact, and PERRL  HENT: ear canals- normal; TMs- normal; Nose- normal; Mouth- no ulcers, no lesions  NECK: no tenderness, no adenopathy, no asymmetry, no masses, no stiffness; thyroid- normal to palpation  RESP: lungs clear to auscultation - no rales, no rhonchi, no wheezes  BREAST: no masses, no tenderness, no nipple discharge, no palpable axillary masses or adenopathy  CV: regular rates and rhythm, normal S1 S2, no S3 or S4 and no murmur, no click or rub -  ABDOMEN: soft, no tenderness, no  hepatosplenomegaly, no masses, normal bowel sounds  MS: extremities- no gross deformities noted, no edema  SKIN: no suspicious lesions, no rashes  NEURO: strength and tone- normal, sensory exam- grossly normal, mentation- intact, speech- normal, reflexes- symmetric  BACK: no CVA tenderness, no paralumbar tenderness  PSYCH: Alert and oriented times 3; " speech- coherent , normal rate and volume; able to articulate logical thoughts, able to abstract reason, no tangential thoughts, no hallucinations or delusions, affect- normal  LYMPHATICS: ant. cervical- normal, post. cervical- normal, axillary- normal, supraclavicular- normal      Assessment and Plan      Stephanie was seen today for physical and imm/inj.    Diagnoses and all orders for this visit:    Routine general medical examination at a health care facility  -     vitamin D3 (CHOLECALCIFEROL) 50 mcg (2000 units) tablet; Take 1 tablet (50 mcg) by mouth daily    Encounter for screening mammogram for breast cancer  -     Screening Mammogram Digital Bilateral; Future    Special screening for malignant neoplasms, colon  -     GASTROENTEROLOGY ADULT REF PROCEDURE ONLY; Future    Elevated blood pressure reading without diagnosis of hypertension  -     TSH with free T4 reflex  -     Hemoglobin (HGB) ([facility])  -     Hemoglobin A1c ([facility])  -     Basic Metabolic Panel ([facility])    Screening for cholesterol level  -     Lipid Cascade ([facility])    Amenorrhea  -     Follicle stimulating hormone  -     Lutropin    Heartburn  -     famotidine (PEPCID) 40 MG tablet; Take 1 tablet (40 mg) by mouth daily    Need for prophylactic vaccination and inoculation against influenza  -     INFLUENZA QUAD, RECOMBINANT, P-FREE (RIV4) (FLUBLOCK) [95969]      1) Mammo, colonoscopy ordered today; UTD with pap  2) Flu shot today, advised to get shingles shot  3) Labs - screening cholesterol, check FSH/LH regarding menopause; baseline labs given recent BP elevation  4) Trial of H1-blocker instead of PPI for heartburn control  5) Health diet, exercise    Options for treatment and follow-up care were reviewed with the patient . Stephanie Reed and/or guardian engaged in the decision making process and verbalized understanding of the options discussed and agreed with the final plan.    Tori Severino MD

## 2021-02-10 ENCOUNTER — TELEPHONE (OUTPATIENT)
Dept: GASTROENTEROLOGY | Facility: CLINIC | Age: 51
End: 2021-02-10

## 2021-02-10 NOTE — TELEPHONE ENCOUNTER
Patient is scheduled for colonoscopy with Dr. Mckenzie    Spoke with: Stephanie Reed    Date of Procedure: 3/9/2021    [place] [facility] [place]edation Type C/S    Pre-op for Unit J MAC and OR not needed    (if yes advise patient they will need a pre-op prior to procedure)      Is patient on blood thinners? -no (If yes- inform patient to follow up with PCP or provider for follow up instructions)     Informed patient they will need an adult  yes     Informed Patient of COVID Test Requirement yes and scheduled     Preferred Pharmacy for Pre Prescription on chart    Confirmed Nurse will call to complete assessment yes    Additional comments: no

## 2021-02-22 DIAGNOSIS — Z11.59 ENCOUNTER FOR SCREENING FOR OTHER VIRAL DISEASES: ICD-10-CM

## 2021-02-23 DIAGNOSIS — Z12.31 ENCOUNTER FOR SCREENING MAMMOGRAM FOR BREAST CANCER: ICD-10-CM

## 2021-02-23 PROCEDURE — 77067 SCR MAMMO BI INCL CAD: CPT | Mod: GC | Performed by: RADIOLOGY

## 2021-03-06 DIAGNOSIS — Z11.59 ENCOUNTER FOR SCREENING FOR OTHER VIRAL DISEASES: ICD-10-CM

## 2021-03-06 LAB
LABORATORY COMMENT REPORT: NORMAL
SARS-COV-2 RNA RESP QL NAA+PROBE: NEGATIVE
SARS-COV-2 RNA RESP QL NAA+PROBE: NORMAL
SPECIMEN SOURCE: NORMAL
SPECIMEN SOURCE: NORMAL

## 2021-03-06 PROCEDURE — U0003 INFECTIOUS AGENT DETECTION BY NUCLEIC ACID (DNA OR RNA); SEVERE ACUTE RESPIRATORY SYNDROME CORONAVIRUS 2 (SARS-COV-2) (CORONAVIRUS DISEASE [COVID-19]), AMPLIFIED PROBE TECHNIQUE, MAKING USE OF HIGH THROUGHPUT TECHNOLOGIES AS DESCRIBED BY CMS-2020-01-R: HCPCS | Mod: 90 | Performed by: PATHOLOGY

## 2021-03-06 PROCEDURE — U0005 INFEC AGEN DETEC AMPLI PROBE: HCPCS | Mod: 90 | Performed by: PATHOLOGY

## 2021-03-09 ENCOUNTER — HOSPITAL ENCOUNTER (OUTPATIENT)
Facility: AMBULATORY SURGERY CENTER | Age: 51
Discharge: HOME | End: 2021-03-09
Attending: INTERNAL MEDICINE | Admitting: INTERNAL MEDICINE
Payer: COMMERCIAL

## 2021-03-09 VITALS
TEMPERATURE: 97.5 F | BODY MASS INDEX: 27.88 KG/M2 | OXYGEN SATURATION: 100 % | SYSTOLIC BLOOD PRESSURE: 118 MMHG | DIASTOLIC BLOOD PRESSURE: 76 MMHG | RESPIRATION RATE: 12 BRPM | HEIGHT: 60 IN | WEIGHT: 142 LBS | HEART RATE: 67 BPM

## 2021-03-09 LAB
COLONOSCOPY: NORMAL
HCG UR QL: NEGATIVE
INTERNAL QC OK POCT: YES

## 2021-03-09 PROCEDURE — 81025 URINE PREGNANCY TEST: CPT | Performed by: INTERNAL MEDICINE

## 2021-03-09 PROCEDURE — 88305 TISSUE EXAM BY PATHOLOGIST: CPT | Mod: GC | Performed by: PATHOLOGY

## 2021-03-09 PROCEDURE — 45380 COLONOSCOPY AND BIOPSY: CPT | Mod: 33

## 2021-03-09 RX ORDER — PROCHLORPERAZINE MALEATE 10 MG
10 TABLET ORAL EVERY 6 HOURS PRN
Status: CANCELLED | OUTPATIENT
Start: 2021-03-09

## 2021-03-09 RX ORDER — FLUMAZENIL 0.1 MG/ML
0.2 INJECTION, SOLUTION INTRAVENOUS
Status: CANCELLED | OUTPATIENT
Start: 2021-03-09 | End: 2021-03-09

## 2021-03-09 RX ORDER — ONDANSETRON 4 MG/1
4 TABLET, ORALLY DISINTEGRATING ORAL EVERY 6 HOURS PRN
Status: CANCELLED | OUTPATIENT
Start: 2021-03-09

## 2021-03-09 RX ORDER — NALOXONE HYDROCHLORIDE 0.4 MG/ML
0.4 INJECTION, SOLUTION INTRAMUSCULAR; INTRAVENOUS; SUBCUTANEOUS
Status: CANCELLED | OUTPATIENT
Start: 2021-03-09 | End: 2021-03-10

## 2021-03-09 RX ORDER — FENTANYL CITRATE 50 UG/ML
INJECTION, SOLUTION INTRAMUSCULAR; INTRAVENOUS PRN
Status: DISCONTINUED | OUTPATIENT
Start: 2021-03-09 | End: 2021-03-09 | Stop reason: HOSPADM

## 2021-03-09 RX ORDER — LIDOCAINE 40 MG/G
CREAM TOPICAL
Status: DISCONTINUED | OUTPATIENT
Start: 2021-03-09 | End: 2021-03-10 | Stop reason: HOSPADM

## 2021-03-09 RX ORDER — ONDANSETRON 2 MG/ML
4 INJECTION INTRAMUSCULAR; INTRAVENOUS EVERY 6 HOURS PRN
Status: CANCELLED | OUTPATIENT
Start: 2021-03-09

## 2021-03-09 RX ORDER — ONDANSETRON 2 MG/ML
4 INJECTION INTRAMUSCULAR; INTRAVENOUS
Status: DISCONTINUED | OUTPATIENT
Start: 2021-03-09 | End: 2021-03-10 | Stop reason: HOSPADM

## 2021-03-09 RX ORDER — NALOXONE HYDROCHLORIDE 0.4 MG/ML
0.2 INJECTION, SOLUTION INTRAMUSCULAR; INTRAVENOUS; SUBCUTANEOUS
Status: CANCELLED | OUTPATIENT
Start: 2021-03-09 | End: 2021-03-10

## 2021-03-09 ASSESSMENT — MIFFLIN-ST. JEOR: SCORE: 1180.61

## 2021-03-10 ENCOUNTER — TELEPHONE (OUTPATIENT)
Dept: FAMILY MEDICINE | Facility: CLINIC | Age: 51
End: 2021-03-10

## 2021-03-10 LAB — COPATH REPORT: NORMAL

## 2021-03-10 NOTE — TELEPHONE ENCOUNTER
Attempted to reach patient to schedule Covid vaccine appointment. LM with main clinic number . If patient returns call please schedule.     ANDRIY Muñoz  Social Work Care Coordinator

## 2021-09-04 ENCOUNTER — HEALTH MAINTENANCE LETTER (OUTPATIENT)
Age: 51
End: 2021-09-04

## 2022-03-31 ENCOUNTER — TELEPHONE (OUTPATIENT)
Dept: RHEUMATOLOGY | Facility: CLINIC | Age: 52
End: 2022-03-31
Payer: COMMERCIAL

## 2022-03-31 DIAGNOSIS — M34.9 SCLERODERMA (H): Primary | ICD-10-CM

## 2022-03-31 NOTE — TELEPHONE ENCOUNTER
Orders for high resolution chest CT, PFTs and echocardiogram entered per Dr. Mckinney's instructions and sent to him for review and signature. Message sent to clinic coordinator to contact patient to assist in scheduling procedures and a 60 minute appointment with Dr. Mckinney.  Maura Dan RN  [facility]

## 2022-04-16 ENCOUNTER — HEALTH MAINTENANCE LETTER (OUTPATIENT)
Age: 52
End: 2022-04-16

## 2022-04-27 ENCOUNTER — TELEPHONE (OUTPATIENT)
Dept: RHEUMATOLOGY | Facility: CLINIC | Age: 52
End: 2022-04-27
Payer: COMMERCIAL

## 2022-04-27 NOTE — TELEPHONE ENCOUNTER
[facility] Call Center    Phone Message    May a detailed message be left on voicemail: yes     Reason for Call: Other: Pt has to reschedule appointment on 7/7. Is it okay to use the [facility] 8am spot on 7/14?      Action Taken: Message routed to:  [facility]    Travel Screening: Not Applicable

## 2022-05-04 ENCOUNTER — ANCILLARY PROCEDURE (OUTPATIENT)
Dept: CT IMAGING | Facility: CLINIC | Age: 52
End: 2022-05-04
Attending: INTERNAL MEDICINE
Payer: COMMERCIAL

## 2022-05-04 ENCOUNTER — ANCILLARY PROCEDURE (OUTPATIENT)
Dept: CARDIOLOGY | Facility: CLINIC | Age: 52
End: 2022-05-04
Attending: INTERNAL MEDICINE
Payer: COMMERCIAL

## 2022-05-04 VITALS — DIASTOLIC BLOOD PRESSURE: 75 MMHG | SYSTOLIC BLOOD PRESSURE: 114 MMHG

## 2022-05-04 DIAGNOSIS — M34.9 SCLERODERMA (H): ICD-10-CM

## 2022-05-04 LAB — LVEF ECHO: NORMAL

## 2022-05-04 PROCEDURE — 71250 CT THORAX DX C-: CPT | Performed by: RADIOLOGY

## 2022-05-04 PROCEDURE — 93306 TTE W/DOPPLER COMPLETE: CPT | Performed by: INTERNAL MEDICINE

## 2022-05-26 ENCOUNTER — OFFICE VISIT (OUTPATIENT)
Dept: PULMONOLOGY | Facility: CLINIC | Age: 52
End: 2022-05-26
Attending: INTERNAL MEDICINE
Payer: COMMERCIAL

## 2022-05-26 VITALS
HEART RATE: 91 BPM | HEIGHT: 58 IN | WEIGHT: 130 LBS | DIASTOLIC BLOOD PRESSURE: 78 MMHG | BODY MASS INDEX: 27.29 KG/M2 | SYSTOLIC BLOOD PRESSURE: 134 MMHG | OXYGEN SATURATION: 100 %

## 2022-05-26 DIAGNOSIS — I73.00 RAYNAUD'S PHENOMENON WITHOUT GANGRENE: ICD-10-CM

## 2022-05-26 DIAGNOSIS — M35.1 MIXED CONNECTIVE TISSUE DISEASE (H): Primary | ICD-10-CM

## 2022-05-26 DIAGNOSIS — M34.9 SCLERODERMA (H): ICD-10-CM

## 2022-05-26 DIAGNOSIS — M35.00 SICCA SYNDROME (H): ICD-10-CM

## 2022-05-26 PROCEDURE — 94729 DIFFUSING CAPACITY: CPT | Performed by: INTERNAL MEDICINE

## 2022-05-26 PROCEDURE — 99203 OFFICE O/P NEW LOW 30 MIN: CPT | Performed by: INTERNAL MEDICINE

## 2022-05-26 PROCEDURE — G0463 HOSPITAL OUTPT CLINIC VISIT: HCPCS | Mod: 25

## 2022-05-26 PROCEDURE — 94375 RESPIRATORY FLOW VOLUME LOOP: CPT | Performed by: INTERNAL MEDICINE

## 2022-05-26 PROCEDURE — 94726 PLETHYSMOGRAPHY LUNG VOLUMES: CPT | Performed by: INTERNAL MEDICINE

## 2022-05-26 ASSESSMENT — PAIN SCALES - GENERAL: PAINLEVEL: NO PAIN (0)

## 2022-05-26 NOTE — LETTER
2022         RE: Stephanie Reed  [address]  [facility] [address]        Dear Colleague,    Thank you for referring your patient, Stephanie Reed, to the [facility] [place]. Please see a copy of my visit note below.    [facility]  Rheumatology Clinic  Efrain Mckinney MD  2022     Name: Stephanie Reed  MRN: [id number]  Age: 48 year old  : 1970  Referring provider: Tori Severino    ASSESSMENT:   1. Mixed connective tissue disease with high titer RNP positivity, low titer rheumatoid factor and clinically with predominantly scleroderma features with typical skin thickening and skin biopsy consistent with the diagnosis in .   2. History of good improvement of skin with phototherapy plus CellCept managed through Dr. Sarmiento (no longer on cellcept).  3. Marked sicca symptoms improved with Evoxac (no longer taking).  4. GERD with normal gastric motility study, requiring no medications for control currently.  5. History of cardiopulmonary symptoms with history of slightly elevated NT-proBNP currently normal and essentially normal echocardiogram in 2007 showing only trace TR, but with recent development of several-minute long symptoms of palpitations and difficulty breathing.   6. Intermittently painful stiff joints somewhat improved with ibuprofen.  7. History of carpal tunnel syndrome.   8. Vitamin D deficiency.     PLAN:    It is not entirely clear to me at this point following her acute symptoms in February and March to what extent all of her symptoms were explained as a viral infection versus a viral infection triggering some of her autoimmunity, but at this point she seems to have reestablished her baseline, and I think the issue of exactly what caused what is somewhat academic.    We now have a new set of pulmonary function test which fortunately are very reassuring despite the fact that she has not done them for years.  She does  not have enough in the way of joint symptoms that I would encourage her to go back on Plaquenil, and her Keratoconjunctivitis sicca is also stable and fairly minimal and she does not really want or need to go back on those medicines.    Accordingly I think we can continue to see her on an as-needed basis with perhaps every 2-year follow-up to simply check her lung function though it has been so good for so long now I am not even sure we absolutely need to do that.    She is reassured.  We talked about possibly redoing her autoantibodies but I do not see much point.  They have been very high titer and I doubt they would have gone away, and we certainly would not put her on more medicine if we found them.    She will let us know if she is having a difficult additional problems we will otherwise plan on following up in about 2 years or sooner as needed      Follow-up: No follow-ups on file.     HPI:   Stephanie Reed is a 48 year old female who presents for followup of her MCTD with high-titer RNP antibodies, borderline rheumatoid factor, sclerodactyly, alopecia, joint symptoms, and low pulmonary volumes and DLCO on prior workups.     I last saw the patient over 3 years ago.  She has generally felt fine and has not had significant problems attributable to her autoimmunity despite her high titer RNP antibodies.    She has not been on any active therapy now for some time.    Unfortunately she got ill in February.  She felt like she had a virus but was tested for COVID and was negative.  Those results are documented in the chart and were not simply a home test.  She however felt poorly for a prolonged period of time afterwards.  Prior to this infection she had morning stiffness lasting for only about 10 to 15 minutes.  Following the infection and throughout the entire month of March she felt stiff all day and felt like she was diffusely swollen.  She tried ibuprofen 400 and then 600 mg doses and did not get much in the way of  help.  She did not call in for steroids and did not have additional laboratory testing done.  She would have some occasional cough or mucus but that was clear and not discolored.    Throughout this period of time she continued to have Raynauds phenomena but felt it was stable.  She was able to break her attacks and has not had development of digital ulcers.  She also has some keratoconjunctivitis sicca and has had that for some time.  That also has been stable.    Fortunately over the last 4 to 6 weeks, since her appointment was made, she has gradually felt better and better and now feels that she is back to her baseline.  She still has some morning stiffness symptoms in the joints lasting 10 to 15 minutes has some low-grade Keratoconjunctivitis sicca that she thinks is unchanged over the last several years, and Raynauds phenomena also unchanged.    She feels her breathing is stable and she can do what ever she needs to do from an exercise standpoint, and that she is not fundamentally or significantly different than she would have been several years ago.  PFTs repeated today show her forced vital capacity at 80% of predicted, her DLCO to 103% of predicted.         Review of Systems:     Because of her greater than 3-year hiatus she was given an intake as a new patient today.  That was performed on March 8 when she was having a good deal of symptoms and is available as the telephone encounter of that date.  This is reviewed.  It is notable primarily for features she has had in the past including the Raynauds, some diarrhea at the time which has now resolved, some GERD, some weight loss at the time now stabilized, but history of migraine headaches, and history of carpal tunnel syndrome.  Remainder of that 10 point review of systems is negative or confirms known past medical history.        Active Medications:     Current Outpatient Medications:      artificial tears SOLN, Instill one drop twice daily, Disp: , Rfl:      " famotidine (PEPCID) 40 MG tablet, Take 1 tablet (40 mg) by mouth daily, Disp: 90 tablet, Rfl: 3     OMEPRAZOLE PO, , Disp: , Rfl:      vitamin D3 (CHOLECALCIFEROL) 50 mcg (2000 units) tablet, Take 1 tablet (50 mcg) by mouth daily, Disp: 90 tablet, Rfl: 3      Allergies:   The patient reports no known allergies.     Past Medical History:  Carpal tunnel syndrome   Dry eye   Heartburn  Iron deficiency anemia  Long term use of plaquenil   Scleroderma  Vitamin D deficiency  Sicca syndrome  Systemic lupus erythematosus  Raynaud's phenomenon without gangrene     Past Surgical History:  Tubal ligation      Family History:   The patient was adopted.      Social History:   No tobacco use history.   No alcohol consumption.      Physical Exam:   /78 (BP Location: Right arm)   Pulse 91   Ht 1.48 m (4' 10.25\")   Wt 59 kg (130 lb)   SpO2 100%   BMI 26.94 kg/m     Wt Readings from Last 4 Encounters:   05/26/22 59 kg (130 lb)   03/09/21 64.4 kg (142 lb)   02/08/21 64.7 kg (142 lb 9.6 oz)   08/25/20 59.9 kg (132 lb)     Constitutional: Well-developed, appearing stated age; cooperative  Eyes: Normal EOM, PERRLA, vision, conjunctiva, sclera  ENT: Normal external ears, nose, hearing, lips, teeth, gums, throat. No mucous membrane lesions, normal saliva pool  Neck: No mass or thyroid enlargement  Resp: Lungs clear to auscultation, nl to palpation  CV: RRR, no murmurs, rubs or gallops, no edema  GI: No ABD mass or tenderness, no HSM  : Not tested  Lymph: No cervical, supraclavicular, inguinal or epitrochlear nodes  MS: The TMJ, neck, shoulder, elbow, wrist, MCP/PIP/DIP, spine, hip, knee, ankle, and foot MTP/IP joints were examined and found normal. No active synovitis or altered joint anatomy. Full joint ROM. Normal  strength. No dactylitis,  tenosynovitis, enthesopathy.  Skin: No nail pitting, alopecia, rash, nodules or lesions  Neuro: Normal cranial nerves, strength, sensation, DTRs.   Psych: Normal judgement, " orientation, memory, affect.       Laboratory:   RHEUM RESULTS Latest Ref Rng & Units 5/23/2006 6/6/2006 6/14/2006   ALBUMIN 3.4 - 5.0 g/dL - 4.1 4.1   ALT 0 - 50 U/L - 43 15   AST 0 - 45 U/L - 105(H) 39   COMPLEMENT C3 76 - 169 mg/dL - - -   COMPLEMENT C4 15 - 50 mg/dL - - -   CK TOTAL 30 - 225 U/L - - -   CREATININE 0.5 - 1.0 mg/dL - - 0.50(L)   CRP 0.0 - 8.0 mg/L 0.6 - -   DNA <10 IU/mL - - -   ERIC - >10.0 - -   GFR ESTIMATE, IF BLACK >60.0 mL/min/1.7 m2 - - -   GFR ESTIMATE >60.0 mL/min/1.7 m2 - - -   HEMATOCRIT 35.0 - 47.0 % - - -   HEMOGLOBIN 11.7 - 15.7 g/dL - - -   HEPBANG NEG - - Negative   HCVAB NEG - - Negative   IGA 70 - 380 mg/dL - - -   IGM 60 - 265 mg/dL - - -    - 1,620 mg/dL - - -   WBC 4.0 - 11.0 10e9/L - - -   RBC 3.8 - 5.2 10e12/L - - -   RDW 10.0 - 15.0 % - - -   MCHC 31.5 - 36.5 g/dL - - -   MCV 78 - 100 fl - - -   PLATELET COUNT 150 - 450 10e9/L - - -   RHEUMATOID FACTOR <20 IU/mL 26(H) - -   ESR 0 - 20 mm/h - - -       Rheumatoid Factor   Date Value Ref Range Status   06/16/2016 <20 <20 IU/mL Final   ,  ,  ,  ,   Scleroderma Antibody Scl-70 ERICK IgG   Date Value Ref Range Status   01/12/2017 0.2 0.0 - 0.9 AI Final     Comment:     Negative   Antibody index (AI) values reflect qualitative changes in antibody   concentration that cannot be directly associated with clinical condition or   disease state.       SSA (Ro) (ERICK) Antibody, IgG   Date Value Ref Range Status   01/12/2017 2.3 (H) 0.0 - 0.9 AI Final     Comment:     Positive   Antibody index (AI) values reflect qualitative changes in antibody   concentration that cannot be directly associated with clinical condition or   disease state.       SSB (La) (ERICK) Antibody, IgG   Date Value Ref Range Status   01/12/2017  0.0 - 0.9 AI Final    <0.2  Negative   Antibody index (AI) values reflect qualitative changes in antibody   concentration that cannot be directly associated with clinical condition or   disease state.       ,   Ribonucleic  Protein IgG Antibody   Date Value Ref Range Status   05/05/2009 199 (H)  Final     Comment:     Reference range: 0 to 40  Unit: AU/mL  (Note)  REFERENCE INTERVAL: Ribonucleic Protein (ERICK), IgG   29 AU/mL or Less ............. Negative   30 - 40 AU/mL ................ Equivocal   41 AU/mL or Greater .......... Positive    RNP antibody is seen in % of mixed connective tissue  disease and is considered specific for this syndrome if  other antibodies are negative. RNP is also present in  20-30% of systemic lupus erythematosus (SLE) and 15-25%  of progressive systemic sclerosis (PSS).  Performed by [facility],  [address] [facility],[place] [id number] [phone]  [email or web address], Jeffery Stephens MD - Lab. Director     Dan Antibody IgG   Date Value Ref Range Status   08/08/2007 43  Final     Comment:     Reference range: 0 to 49  Unit: Units  (Note)  REFERENCE INTERVALS: SMITH (ERICK) Ab, IgG   Less than 20 Units ...... None detected   20 - 49 Units ........... Inconclusive   50 Units or greater ..... Positive    Dan antibody is very specific forsystemic lupus  erythematosus (SLE) but only occurs in 30-35% of SLE  cases. The presence of antibodies to Dan is often  associated with renal disease.  Performed by [facility],  [address] [facility], [place] [id number] [phone]  [email or web address] [email or web address] MD - Lab. Director     SSA (RO) Antibody IgG   Date Value Ref Range Status   05/05/2009 39  Final     Comment:     Reference range: 0 to 40  Unit: AU/mL  (Note)  REFERENCE INTERVALS: SSA (Ro) (ERICK) Ab, IgG   29 AU/mL or Less ............. Negative   30 - 40 AU/mL ................ Equivocal   41 AU/mL or Greater .......... Positive    SSA (Ro) antibody is seenin 70-75% of Sjogren syndrome  cases, 30-40% of systemic lupus erythematosus (SLE) and  5-10% of progressive systemic sclerosis (PSS).  Performed by [facility],  [address] [place] [address] [phone]  [email or web address], Jeffery Stephens MD -  Lab. Director     SSB (LA) Antibody IgG   Date Value Ref Range Status   05/05/2009 0  Final     Comment:     Reference range: 0 to 40  Unit: AU/mL  (Note)  REFERENCE INTERVALS: SSB (La) (ERICK) Ab, IgG   29 AU/mL or Less ............. Negative   30 - 40 AU/mL ................ Equivocal   41 AU/mL or Greater .......... Positive    SSB (La) antibody is seen in 50-60% of Sjogren syndrome  cases and is specific if it is the only ERICK antibody  present. 15-25% of patients with systemic lupus  erythematosus (SLE) and 5-10% of patients with progressive  systemic sclerosis (PSS) also have this antibody.  Performed by [facility],  [address] [place] [address],[place] [address] [phone]  [email or web address], Jeffery Stephens MD - Lab. Director     Scleroderma Antibody IgG   Date Value Ref Range Status   09/07/2006 6  Final     Comment:     Reference range: 0 to 49  Unit: Units  (Note)  REFERENCE INTERVALS: Scleroderma (Scl-70) (ERICK) Ab, IgG   Less than 20 Units ....... None detected   20 - 49 Units ............ Inconclusive   50 Units or greater ...... Positive    Scleroderma (Scl-70) antibody is seen in 20-60% of  patients with scleroderma and is considered diagnostic  and specific for scleroderma if it is the only ERICK  antibody present. Scl-70 is also seen in approximately  25% of progressive systemic sclerosis (PSS).  The above test was performed at: [facility],  [address] [place] [address] [place]  [address]  [phone]  [email or web address]   ,  ,   MOISE Screen by EIA   Date Value Ref Range Status   05/23/2006 >10.0  Final     Comment:     Interpretation:  Positive   MOISE screen results > 3 are significant and follow-up testing is recommended.   MOISE samples are retained in the Protein Lab for 30 days.  Please contact the   laboratory to request additional tests.   ,   DNA-ds   Date Value Ref Range Status   06/16/2016 4 <10 IU/mL Final     Comment:     Negative   ,  ,   Beta 2 Glycoprotein 1 Antibody IgG   Date Value Ref Range Status   01/12/2017  <0.6  Negative   <7 U/mL Final   ,   Cardiolipin Antibody IgG   Date Value Ref Range Status   01/12/2017  0.0 - 19.9 GPL-U/mL Final    <1.6  Negative   New method in use November 28, 2016.       Cardiolipin Antibody IgM   Date Value Ref Range Status   01/12/2017 0.6 0.0 - 19.9 MPL-U/mL Final     Comment:     Negative   New method in use November 28, 2016.       ,  ,  ,  ,   Hep B Surface Agn   Date Value Ref Range Status   06/14/2006 Negative NEG Final   ,  ,  ,  ,  ,  ,  ,   Centromere Zoe IgG   Date Value Ref Range Status   05/05/2009 1  Final     Comment:     Reference range: 0 to 40  Unit: AU/mL  (Note)  TEST INFORMATION: Centromere Ab, IgG   29 AU/mL or Less ............. Negative   30 - 40 AU/mL ................ Equivocal   41 AU/mL or Greater .......... Positive    Centromere antibodies are presentin 80-90% of individuals  with  CREST variant scleroderma.  This antibody is also seen in  30% of  Raynaud patients, 12% of patients with mixed  connective-tissue  disease, diffuse scleroderma, interstitial pulmonary  fibrosis,  primary biliary cirrhosis, and in a smaller percent of  patients  with systemic lupus erythematosus (SLE) and RA.  Performed by [facility],  [address] [place] [address] [phone]  [email or web address], Jeffery Stephens MD - Lab. Director   ,  ,  ,  ,  ,  ,  ,  ,  ,  ,  ,  ,  ,   Albumin Fraction   Date Value Ref Range Status   05/22/2014 4.0 3.7 - 5.1 g/dL Final     Alpha 2 Fraction   Date Value Ref Range Status   05/22/2014 0.8 0.5 - 0.9 g/dL Final     Beta Fraction   Date Value Ref Range Status   05/22/2014 1.2 (H) 0.6 - 1.0 g/dL Final     Gamma Fraction   Date Value Ref Range Status   05/22/2014 1.9 (H) 0.7 - 1.6 g/dL Final     Monoclonal Peak   Date Value Ref Range Status   05/22/2014 0.0 0.0 g/dL Final     ELP Interpretation:   Date Value Ref Range Status   05/22/2014   Final    Polyclonal increase in the gamma fraction and increased beta fraction.  No   monoclonal protein  seen. This pattern is seen in a variety of inflammatory   disorders.  Pathologic significance requires clinical correlation.  ROSCOE Blanco M.D., Ph.D., Pathologist       ,  ,   Immunofixation ELP   Date Value Ref Range Status   05/22/2014   Final    No monoclonal protein seen on immunofixation.  Pathological significance   requires clinical correlation.   ROSCOE Blanco M.D., Ph.D.       IGG   Date Value Ref Range Status   05/22/2014 1,790 (H) 695 - 1,620 mg/dL Final     IGA   Date Value Ref Range Status   05/22/2014 631 (H) 70 - 380 mg/dL Final     IGM   Date Value Ref Range Status   05/22/2014 202 60 - 265 mg/dL Final   ,  ,  ,  ,   Dan ERICK Antibody IgG   Date Value Ref Range Status   01/12/2017 0.2 0.0 - 0.9 AI Final     Comment:     Negative   Antibody index (AI) values reflect qualitative changes in antibody   concentration that cannot be directly associated with clinical condition or   disease state.       ,   Scleroderma Antibody Scl-70 ERICK IgG   Date Value Ref Range Status   01/12/2017 0.2 0.0 - 0.9 AI Final     Comment:     Negative   Antibody index (AI) values reflect qualitative changes in antibody   concentration that cannot be directly associated with clinical condition or   disease state.       ,  ,            Answers for HPI/ROS submitted by the patient on 5/26/2022  General Symptoms: No  Skin Symptoms: No  HENT Symptoms: No  EYE SYMPTOMS: No  HEART SYMPTOMS: No  LUNG SYMPTOMS: No  INTESTINAL SYMPTOMS: No  URINARY SYMPTOMS: No  GYNECOLOGIC SYMPTOMS: No  BREAST SYMPTOMS: No  SKELETAL SYMPTOMS: Yes  BLOOD SYMPTOMS: No  NERVOUS SYSTEM SYMPTOMS: No  MENTAL HEALTH SYMPTOMS: No

## 2022-05-26 NOTE — PROGRESS NOTES
[facility]  Rheumatology Clinic  Efrain Mckinney MD  2022     Name: Stephanie Reed  MRN: [id number]  Age: 48 year old  : 1970  Referring provider: Tori Severino    ASSESSMENT:   1. Mixed connective tissue disease with high titer RNP positivity, low titer rheumatoid factor and clinically with predominantly scleroderma features with typical skin thickening and skin biopsy consistent with the diagnosis in .   2. History of good improvement of skin with phototherapy plus CellCept managed through Dr. Sarmiento (no longer on cellcept).  3. Marked sicca symptoms improved with Evoxac (no longer taking).  4. GERD with normal gastric motility study, requiring no medications for control currently.  5. History of cardiopulmonary symptoms with history of slightly elevated NT-proBNP currently normal and essentially normal echocardiogram in 2007 showing only trace TR, but with recent development of several-minute long symptoms of palpitations and difficulty breathing.   6. Intermittently painful stiff joints somewhat improved with ibuprofen.  7. History of carpal tunnel syndrome.   8. Vitamin D deficiency.     PLAN:    It is not entirely clear to me at this point following her acute symptoms in February and March to what extent all of her symptoms were explained as a viral infection versus a viral infection triggering some of her autoimmunity, but at this point she seems to have reestablished her baseline, and I think the issue of exactly what caused what is somewhat academic.    We now have a new set of pulmonary function test which fortunately are very reassuring despite the fact that she has not done them for years.  She does not have enough in the way of joint symptoms that I would encourage her to go back on Plaquenil, and her Keratoconjunctivitis sicca is also stable and fairly minimal and she does not really want or need to go back on those medicines.    Accordingly I think we can continue to see  her on an as-needed basis with perhaps every 2-year follow-up to simply check her lung function though it has been so good for so long now I am not even sure we absolutely need to do that.    She is reassured.  We talked about possibly redoing her autoantibodies but I do not see much point.  They have been very high titer and I doubt they would have gone away, and we certainly would not put her on more medicine if we found them.    She will let us know if she is having a difficult additional problems we will otherwise plan on following up in about 2 years or sooner as needed      Follow-up: No follow-ups on file.     HPI:   Stephanie Reed is a 48 year old female who presents for followup of her MCTD with high-titer RNP antibodies, borderline rheumatoid factor, sclerodactyly, alopecia, joint symptoms, and low pulmonary volumes and DLCO on prior workups.     I last saw the patient over 3 years ago.  She has generally felt fine and has not had significant problems attributable to her autoimmunity despite her high titer RNP antibodies.    She has not been on any active therapy now for some time.    Unfortunately she got ill in February.  She felt like she had a virus but was tested for COVID and was negative.  Those results are documented in the chart and were not simply a home test.  She however felt poorly for a prolonged period of time afterwards.  Prior to this infection she had morning stiffness lasting for only about 10 to 15 minutes.  Following the infection and throughout the entire month of March she felt stiff all day and felt like she was diffusely swollen.  She tried ibuprofen 400 and then 600 mg doses and did not get much in the way of help.  She did not call in for steroids and did not have additional laboratory testing done.  She would have some occasional cough or mucus but that was clear and not discolored.    Throughout this period of time she continued to have Raynauds phenomena but felt it was stable.   She was able to break her attacks and has not had development of digital ulcers.  She also has some keratoconjunctivitis sicca and has had that for some time.  That also has been stable.    Fortunately over the last 4 to 6 weeks, since her appointment was made, she has gradually felt better and better and now feels that she is back to her baseline.  She still has some morning stiffness symptoms in the joints lasting 10 to 15 minutes has some low-grade Keratoconjunctivitis sicca that she thinks is unchanged over the last several years, and Raynauds phenomena also unchanged.    She feels her breathing is stable and she can do what ever she needs to do from an exercise standpoint, and that she is not fundamentally or significantly different than she would have been several years ago.  PFTs repeated today show her forced vital capacity at 80% of predicted, her DLCO to 103% of predicted.         Review of Systems:     Because of her greater than 3-year hiatus she was given an intake as a new patient today.  That was performed on March 8 when she was having a good deal of symptoms and is available as the telephone encounter of that date.  This is reviewed.  It is notable primarily for features she has had in the past including the Raynauds, some diarrhea at the time which has now resolved, some GERD, some weight loss at the time now stabilized, but history of migraine headaches, and history of carpal tunnel syndrome.  Remainder of that 10 point review of systems is negative or confirms known past medical history.        Active Medications:     Current Outpatient Medications:      artificial tears SOLN, Instill one drop twice daily, Disp: , Rfl:      famotidine (PEPCID) 40 MG tablet, Take 1 tablet (40 mg) by mouth daily, Disp: 90 tablet, Rfl: 3     OMEPRAZOLE PO, , Disp: , Rfl:      vitamin D3 (CHOLECALCIFEROL) 50 mcg (2000 units) tablet, Take 1 tablet (50 mcg) by mouth daily, Disp: 90 tablet, Rfl: 3      Allergies:   The  "patient reports no known allergies.     Past Medical History:  Carpal tunnel syndrome   Dry eye   Heartburn  Iron deficiency anemia  Long term use of plaquenil   Scleroderma  Vitamin D deficiency  Sicca syndrome  Systemic lupus erythematosus  Raynaud's phenomenon without gangrene     Past Surgical History:  Tubal ligation      Family History:   The patient was adopted.      Social History:   No tobacco use history.   No alcohol consumption.      Physical Exam:   /78 (BP Location: Right arm)   Pulse 91   Ht 1.48 m (4' 10.25\")   Wt 59 kg (130 lb)   SpO2 100%   BMI 26.94 kg/m     Wt Readings from Last 4 Encounters:   05/26/22 59 kg (130 lb)   03/09/21 64.4 kg (142 lb)   02/08/21 64.7 kg (142 lb 9.6 oz)   08/25/20 59.9 kg (132 lb)     Constitutional: Well-developed, appearing stated age; cooperative  Eyes: Normal EOM, PERRLA, vision, conjunctiva, sclera  ENT: Normal external ears, nose, hearing, lips, teeth, gums, throat. No mucous membrane lesions, normal saliva pool  Neck: No mass or thyroid enlargement  Resp: Lungs clear to auscultation, nl to palpation  CV: RRR, no murmurs, rubs or gallops, no edema  GI: No ABD mass or tenderness, no HSM  : Not tested  Lymph: No cervical, supraclavicular, inguinal or epitrochlear nodes  MS: The TMJ, neck, shoulder, elbow, wrist, MCP/PIP/DIP, spine, hip, knee, ankle, and foot MTP/IP joints were examined and found normal. No active synovitis or altered joint anatomy. Full joint ROM. Normal  strength. No dactylitis,  tenosynovitis, enthesopathy.  Skin: No nail pitting, alopecia, rash, nodules or lesions  Neuro: Normal cranial nerves, strength, sensation, DTRs.   Psych: Normal judgement, orientation, memory, affect.       Laboratory:   RHEUM RESULTS Latest Ref Rng & Units 5/23/2006 6/6/2006 6/14/2006   ALBUMIN 3.4 - 5.0 g/dL - 4.1 4.1   ALT 0 - 50 U/L - 43 15   AST 0 - 45 U/L - 105(H) 39   COMPLEMENT C3 76 - 169 mg/dL - - -   COMPLEMENT C4 15 - 50 mg/dL - - -   CK TOTAL " 30 - 225 U/L - - -   CREATININE 0.5 - 1.0 mg/dL - - 0.50(L)   CRP 0.0 - 8.0 mg/L 0.6 - -   DNA <10 IU/mL - - -   ERIC - >10.0 - -   GFR ESTIMATE, IF BLACK >60.0 mL/min/1.7 m2 - - -   GFR ESTIMATE >60.0 mL/min/1.7 m2 - - -   HEMATOCRIT 35.0 - 47.0 % - - -   HEMOGLOBIN 11.7 - 15.7 g/dL - - -   HEPBANG NEG - - Negative   HCVAB NEG - - Negative   IGA 70 - 380 mg/dL - - -   IGM 60 - 265 mg/dL - - -    - 1,620 mg/dL - - -   WBC 4.0 - 11.0 10e9/L - - -   RBC 3.8 - 5.2 10e12/L - - -   RDW 10.0 - 15.0 % - - -   MCHC 31.5 - 36.5 g/dL - - -   MCV 78 - 100 fl - - -   PLATELET COUNT 150 - 450 10e9/L - - -   RHEUMATOID FACTOR <20 IU/mL 26(H) - -   ESR 0 - 20 mm/h - - -       Rheumatoid Factor   Date Value Ref Range Status   06/16/2016 <20 <20 IU/mL Final   ,  ,  ,  ,   Scleroderma Antibody Scl-70 ERICK IgG   Date Value Ref Range Status   01/12/2017 0.2 0.0 - 0.9 AI Final     Comment:     Negative   Antibody index (AI) values reflect qualitative changes in antibody   concentration that cannot be directly associated with clinical condition or   disease state.       SSA (Ro) (ERICK) Antibody, IgG   Date Value Ref Range Status   01/12/2017 2.3 (H) 0.0 - 0.9 AI Final     Comment:     Positive   Antibody index (AI) values reflect qualitative changes in antibody   concentration that cannot be directly associated with clinical condition or   disease state.       SSB (La) (ERICK) Antibody, IgG   Date Value Ref Range Status   01/12/2017  0.0 - 0.9 AI Final    <0.2  Negative   Antibody index (AI) values reflect qualitative changes in antibody   concentration that cannot be directly associated with clinical condition or   disease state.       ,   Ribonucleic Protein IgG Antibody   Date Value Ref Range Status   05/05/2009 199 (H)  Final     Comment:     Reference range: 0 to 40  Unit: AU/mL  (Note)  REFERENCE INTERVAL: Ribonucleic Protein (ERICK), IgG   29 AU/mL or Less ............. Negative   30 - 40 AU/mL ................ Equivocal   41  AU/mL or Greater .......... Positive    RNP antibody is seen in % of mixed connective tissue  disease and is considered specific for this syndrome if  other antibodies are negative. RNP is also present in  20-30% of systemic lupus erythematosus (SLE) and 15-25%  of progressive systemic sclerosis (PSS).  Performed by [facility],  [address] [facility] [place] [facility],[place] [id number] [phone]  [email or web address], Jeffery Stephens MD - Lab. Director     Dan Antibody IgG   Date Value Ref Range Status   08/08/2007 43  Final     Comment:     Reference range: 0 to 49  Unit: Units  (Note)  REFERENCE INTERVALS: SMITH (ERICK) Ab, IgG   Less than 20 Units ...... None detected   20 - 49 Units ........... Inconclusive   50 Units or greater ..... Positive    Dan antibody is very specific forsystemic lupus  erythematosus (SLE) but only occurs in 30-35% of SLE  cases. The presence of antibodies to Dan is often  associated with renal disease.  Performed by [facility],  [address] [facility] [place] [facility], [place] [id number] [phone]  [email or web address],Jeffery Stephens MD - Lab. Director     SSA (RO) Antibody IgG   Date Value Ref Range Status   05/05/2009 39  Final     Comment:     Reference range: 0 to 40  Unit: AU/mL  (Note)  REFERENCE INTERVALS: SSA (Ro) (ERIKC) Ab, IgG   29 AU/mL or Less ............. Negative   30 - 40 AU/mL ................ Equivocal   41 AU/mL or Greater .......... Positive    SSA (Ro) antibody is seenin 70-75% of Sjogren syndrome  cases, 30-40% of systemic lupus erythematosus (SLE) and  5-10% of progressive systemic sclerosis (PSS).  Performed by [facility],  [address] [facility] [place] [facility],[place] [id number] [phone]  [email or web address], Jeffery Stephens MD - Lab. Director     SSB (LA) Antibody IgG   Date Value Ref Range Status   05/05/2009 0  Final     Comment:     Reference range: 0 to 40  Unit: AU/mL  (Note)  REFERENCE INTERVALS: SSB (La) (ERICK) Ab, IgG   29 AU/mL or Less ............. Negative   30 - 40 AU/mL ................  Equivocal   41 AU/mL or Greater .......... Positive    SSB (La) antibody is seen in 50-60% of Sjogren syndrome  cases and is specific if it is the only ERICK antibody  present. 15-25% of patients with systemic lupus  erythematosus (SLE) and 5-10% of patients with progressive  systemic sclerosis (PSS) also have this antibody.  Performed by [facility],  [address] [facility], [place] [address] [phone]  [email or web address], Jeffery Stephens MD - Lab. Director     Scleroderma Antibody IgG   Date Value Ref Range Status   09/07/2006 6  Final     Comment:     Reference range: 0 to 49  Unit: Units  (Note)  REFERENCE INTERVALS: Scleroderma (Scl-70) (ERICK) Ab, IgG   Less than 20 Units ....... None detected   20 - 49 Units ............ Inconclusive   50 Units or greater ...... Positive    Scleroderma (Scl-70) antibody is seen in 20-60% of  patients with scleroderma and is considered diagnostic  and specific for scleroderma if it is the only ERICK  antibody present. Scl-70 is also seen in approximately  25% of progressive systemic sclerosis (PSS).  The above test was performed at: [facility],  [address] [facility], [place]  [address]  [phone]  [email or web address]   ,  ,   MOISE Screen by EIA   Date Value Ref Range Status   05/23/2006 >10.0  Final     Comment:     Interpretation:  Positive   MOISE screen results > 3 are significant and follow-up testing is recommended.   MOISE samples are retained in the Protein Lab for 30 days.  Please contact the   laboratory to request additional tests.   ,   DNA-ds   Date Value Ref Range Status   06/16/2016 4 <10 IU/mL Final     Comment:     Negative   ,  ,   Beta 2 Glycoprotein 1 Antibody IgG   Date Value Ref Range Status   01/12/2017 <0.6  Negative   <7 U/mL Final   ,   Cardiolipin Antibody IgG   Date Value Ref Range Status   01/12/2017  0.0 - 19.9 GPL-U/mL Final    <1.6  Negative   New method in use November 28, 2016.       Cardiolipin Antibody IgM   Date Value Ref Range Status   01/12/2017 0.6 0.0 -  19.9 MPL-U/mL Final     Comment:     Negative   New method in use November 28, 2016.       ,  ,  ,  ,   Hep B Surface Agn   Date Value Ref Range Status   06/14/2006 Negative NEG Final   ,  ,  ,  ,  ,  ,  ,   Centromere Zoe IgG   Date Value Ref Range Status   05/05/2009 1  Final     Comment:     Reference range: 0 to 40  Unit: AU/mL  (Note)  TEST INFORMATION: Centromere Ab, IgG   29 AU/mL or Less ............. Negative   30 - 40 AU/mL ................ Equivocal   41 AU/mL or Greater .......... Positive    Centromere antibodies are presentin 80-90% of individuals  with  CREST variant scleroderma.  This antibody is also seen in  30% of  Raynaud patients, 12% of patients with mixed  connective-tissue  disease, diffuse scleroderma, interstitial pulmonary  fibrosis,  primary biliary cirrhosis, and in a smaller percent of  patients  with systemic lupus erythematosus (SLE) and RA.  Performed by [facility],  [address] [place] [address] [phone]  [email or web address], Jeffery Stephens MD - Lab. Director   ,  ,  ,  ,  ,  ,  ,  ,  ,  ,  ,  ,  ,   Albumin Fraction   Date Value Ref Range Status   05/22/2014 4.0 3.7 - 5.1 g/dL Final     Alpha 2 Fraction   Date Value Ref Range Status   05/22/2014 0.8 0.5 - 0.9 g/dL Final     Beta Fraction   Date Value Ref Range Status   05/22/2014 1.2 (H) 0.6 - 1.0 g/dL Final     Gamma Fraction   Date Value Ref Range Status   05/22/2014 1.9 (H) 0.7 - 1.6 g/dL Final     Monoclonal Peak   Date Value Ref Range Status   05/22/2014 0.0 0.0 g/dL Final     ELP Interpretation:   Date Value Ref Range Status   05/22/2014   Final    Polyclonal increase in the gamma fraction and increased beta fraction.  No   monoclonal protein seen. This pattern is seen in a variety of inflammatory   disorders.  Pathologic significance requires clinical correlation.  ROSCOE Blanco M.D., Ph.D., Pathologist       ,  ,   Immunofixation ELP   Date Value Ref Range Status   05/22/2014   Final    No monoclonal protein  seen on immunofixation.  Pathological significance   requires clinical correlation.   ROSCOE Blanco M.D., Ph.D.       IGG   Date Value Ref Range Status   05/22/2014 1,790 (H) 695 - 1,620 mg/dL Final     IGA   Date Value Ref Range Status   05/22/2014 631 (H) 70 - 380 mg/dL Final     IGM   Date Value Ref Range Status   05/22/2014 202 60 - 265 mg/dL Final   ,  ,  ,  ,   Dan ERICK Antibody IgG   Date Value Ref Range Status   01/12/2017 0.2 0.0 - 0.9 AI Final     Comment:     Negative   Antibody index (AI) values reflect qualitative changes in antibody   concentration that cannot be directly associated with clinical condition or   disease state.       ,   Scleroderma Antibody Scl-70 ERICK IgG   Date Value Ref Range Status   01/12/2017 0.2 0.0 - 0.9 AI Final     Comment:     Negative   Antibody index (AI) values reflect qualitative changes in antibody   concentration that cannot be directly associated with clinical condition or   disease state.       ,  ,            Answers for HPI/ROS submitted by the patient on 5/26/2022  General Symptoms: No  Skin Symptoms: No  HENT Symptoms: No  EYE SYMPTOMS: No  HEART SYMPTOMS: No  LUNG SYMPTOMS: No  INTESTINAL SYMPTOMS: No  URINARY SYMPTOMS: No  GYNECOLOGIC SYMPTOMS: No  BREAST SYMPTOMS: No  SKELETAL SYMPTOMS: Yes  BLOOD SYMPTOMS: No  NERVOUS SYSTEM SYMPTOMS: No  MENTAL HEALTH SYMPTOMS: No

## 2022-05-26 NOTE — NURSING NOTE
Chief Complaint   Patient presents with     Follow Up     Vitals were taken and medications were reconciled.     Dulce Maria Peter RMA  8:43 AM

## 2022-05-31 LAB
DLCOUNC-%PRED-PRE: 103 %
DLCOUNC-PRE: 17.74 ML/MIN/MMHG
DLCOUNC-PRED: 17.18 ML/MIN/MMHG
ERV-%PRED-PRE: 12 %
ERV-PRE: 0.09 L
ERV-PRED: 0.7 L
EXPTIME-PRE: 6.66 SEC
FEF2575-%PRED-PRE: 128 %
FEF2575-PRE: 2.82 L/SEC
FEF2575-PRED: 2.2 L/SEC
FEFMAX-%PRED-PRE: 99 %
FEFMAX-PRE: 5.83 L/SEC
FEFMAX-PRED: 5.85 L/SEC
FEV1-%PRED-PRE: 86 %
FEV1-PRE: 1.8 L
FEV1FEV6-PRE: 88 %
FEV1FEV6-PRED: 82 %
FEV1FVC-PRE: 88 %
FEV1FVC-PRED: 82 %
FEV1SVC-PRE: 82 %
FEV1SVC-PRED: 76 %
FIFMAX-PRE: 3.81 L/SEC
FRCPLETH-%PRED-PRE: 79 %
FRCPLETH-PRE: 1.88 L
FRCPLETH-PRED: 2.38 L
FVC-%PRED-PRE: 80 %
FVC-PRE: 2.06 L
FVC-PRED: 2.55 L
IC-%PRED-PRE: 104 %
IC-PRE: 2.12 L
IC-PRED: 2.04 L
RVPLETH-%PRED-PRE: 117 %
RVPLETH-PRE: 1.8 L
RVPLETH-PRED: 1.52 L
TLCPLETH-%PRED-PRE: 99 %
TLCPLETH-PRE: 4 L
TLCPLETH-PRED: 4.02 L
VA-%PRED-PRE: 78 %
VA-PRE: 3.01 L
VC-%PRED-PRE: 80 %
VC-PRE: 2.21 L
VC-PRED: 2.74 L

## 2022-06-11 ENCOUNTER — HEALTH MAINTENANCE LETTER (OUTPATIENT)
Age: 52
End: 2022-06-11

## 2022-10-22 ENCOUNTER — HEALTH MAINTENANCE LETTER (OUTPATIENT)
Age: 52
End: 2022-10-22

## 2023-01-17 ENCOUNTER — HOSPITAL ENCOUNTER (EMERGENCY)
Facility: CLINIC | Age: 53
Discharge: HOME | End: 2023-01-18
Attending: EMERGENCY MEDICINE | Admitting: EMERGENCY MEDICINE
Payer: COMMERCIAL

## 2023-01-17 DIAGNOSIS — R19.7 VOMITING AND DIARRHEA: ICD-10-CM

## 2023-01-17 DIAGNOSIS — R11.10 VOMITING AND DIARRHEA: ICD-10-CM

## 2023-01-17 DIAGNOSIS — E87.6 HYPOKALEMIA: ICD-10-CM

## 2023-01-17 PROCEDURE — 96375 TX/PRO/DX INJ NEW DRUG ADDON: CPT

## 2023-01-17 PROCEDURE — 96361 HYDRATE IV INFUSION ADD-ON: CPT

## 2023-01-17 PROCEDURE — 96374 THER/PROPH/DIAG INJ IV PUSH: CPT

## 2023-01-17 PROCEDURE — 99284 EMERGENCY DEPT VISIT MOD MDM: CPT | Mod: 25

## 2023-01-17 PROCEDURE — 250N000011 HC RX IP 250 OP 636: Performed by: EMERGENCY MEDICINE

## 2023-01-17 RX ORDER — ONDANSETRON 4 MG/1
4 TABLET, ORALLY DISINTEGRATING ORAL ONCE
Status: COMPLETED | OUTPATIENT
Start: 2023-01-17 | End: 2023-01-17

## 2023-01-17 RX ADMIN — ONDANSETRON 4 MG: 4 TABLET, ORALLY DISINTEGRATING ORAL at 20:44

## 2023-01-18 VITALS
HEART RATE: 73 BPM | DIASTOLIC BLOOD PRESSURE: 86 MMHG | SYSTOLIC BLOOD PRESSURE: 133 MMHG | RESPIRATION RATE: 20 BRPM | OXYGEN SATURATION: 99 % | TEMPERATURE: 98.5 F

## 2023-01-18 LAB
ALBUMIN SERPL BCG-MCNC: 4.6 G/DL (ref 3.5–5.2)
ALP SERPL-CCNC: 77 U/L (ref 35–104)
ALT SERPL W P-5'-P-CCNC: 22 U/L (ref 10–35)
ANION GAP SERPL CALCULATED.3IONS-SCNC: 16 MMOL/L (ref 7–15)
AST SERPL W P-5'-P-CCNC: 24 U/L (ref 10–35)
BASOPHILS # BLD AUTO: 0 10E3/UL (ref 0–0.2)
BASOPHILS NFR BLD AUTO: 0 %
BILIRUB SERPL-MCNC: 0.4 MG/DL
BUN SERPL-MCNC: 19.7 MG/DL (ref 6–20)
CALCIUM SERPL-MCNC: 9.2 MG/DL (ref 8.6–10)
CHLORIDE SERPL-SCNC: 95 MMOL/L (ref 98–107)
CREAT SERPL-MCNC: 0.54 MG/DL (ref 0.51–0.95)
DEPRECATED HCO3 PLAS-SCNC: 28 MMOL/L (ref 22–29)
EOSINOPHIL # BLD AUTO: 0 10E3/UL (ref 0–0.7)
EOSINOPHIL NFR BLD AUTO: 0 %
ERYTHROCYTE [DISTWIDTH] IN BLOOD BY AUTOMATED COUNT: 12.2 % (ref 10–15)
GFR SERPL CREATININE-BSD FRML MDRD: >90 ML/MIN/1.73M2
GLUCOSE SERPL-MCNC: 108 MG/DL (ref 70–99)
HCT VFR BLD AUTO: 39.8 % (ref 35–47)
HGB BLD-MCNC: 13.2 G/DL (ref 11.7–15.7)
IMM GRANULOCYTES # BLD: 0 10E3/UL
IMM GRANULOCYTES NFR BLD: 0 %
LACTATE SERPL-SCNC: 1.1 MMOL/L (ref 0.7–2)
LIPASE SERPL-CCNC: 32 U/L (ref 13–60)
LYMPHOCYTES # BLD AUTO: 0.7 10E3/UL (ref 0.8–5.3)
LYMPHOCYTES NFR BLD AUTO: 15 %
MCH RBC QN AUTO: 28.2 PG (ref 26.5–33)
MCHC RBC AUTO-ENTMCNC: 33.2 G/DL (ref 31.5–36.5)
MCV RBC AUTO: 85 FL (ref 78–100)
MONOCYTES # BLD AUTO: 0.4 10E3/UL (ref 0–1.3)
MONOCYTES NFR BLD AUTO: 8 %
NEUTROPHILS # BLD AUTO: 3.4 10E3/UL (ref 1.6–8.3)
NEUTROPHILS NFR BLD AUTO: 77 %
NRBC # BLD AUTO: 0 10E3/UL
NRBC BLD AUTO-RTO: 0 /100
PLATELET # BLD AUTO: 280 10E3/UL (ref 150–450)
POTASSIUM SERPL-SCNC: 3.1 MMOL/L (ref 3.4–5.3)
PROT SERPL-MCNC: 8.6 G/DL (ref 6.4–8.3)
RBC # BLD AUTO: 4.68 10E6/UL (ref 3.8–5.2)
SODIUM SERPL-SCNC: 139 MMOL/L (ref 136–145)
WBC # BLD AUTO: 4.5 10E3/UL (ref 4–11)

## 2023-01-18 PROCEDURE — 83690 ASSAY OF LIPASE: CPT | Performed by: EMERGENCY MEDICINE

## 2023-01-18 PROCEDURE — 83605 ASSAY OF LACTIC ACID: CPT | Performed by: EMERGENCY MEDICINE

## 2023-01-18 PROCEDURE — 250N000013 HC RX MED GY IP 250 OP 250 PS 637: Performed by: EMERGENCY MEDICINE

## 2023-01-18 PROCEDURE — 258N000003 HC RX IP 258 OP 636: Performed by: EMERGENCY MEDICINE

## 2023-01-18 PROCEDURE — 85025 COMPLETE CBC W/AUTO DIFF WBC: CPT | Performed by: EMERGENCY MEDICINE

## 2023-01-18 PROCEDURE — 36415 COLL VENOUS BLD VENIPUNCTURE: CPT | Performed by: EMERGENCY MEDICINE

## 2023-01-18 PROCEDURE — 80053 COMPREHEN METABOLIC PANEL: CPT | Performed by: EMERGENCY MEDICINE

## 2023-01-18 PROCEDURE — 250N000011 HC RX IP 250 OP 636: Performed by: EMERGENCY MEDICINE

## 2023-01-18 RX ORDER — POTASSIUM CHLORIDE 1500 MG/1
40 TABLET, EXTENDED RELEASE ORAL ONCE
Status: COMPLETED | OUTPATIENT
Start: 2023-01-18 | End: 2023-01-18

## 2023-01-18 RX ORDER — ONDANSETRON 4 MG/1
4 TABLET, ORALLY DISINTEGRATING ORAL EVERY 6 HOURS PRN
Qty: 10 TABLET | Refills: 0 | Status: SHIPPED | OUTPATIENT
Start: 2023-01-18 | End: 2023-07-03

## 2023-01-18 RX ORDER — ONDANSETRON 2 MG/ML
4 INJECTION INTRAMUSCULAR; INTRAVENOUS ONCE
Status: COMPLETED | OUTPATIENT
Start: 2023-01-18 | End: 2023-01-18

## 2023-01-18 RX ADMIN — FAMOTIDINE 20 MG: 10 INJECTION INTRAVENOUS at 00:28

## 2023-01-18 RX ADMIN — SODIUM CHLORIDE 1000 ML: 9 INJECTION, SOLUTION INTRAVENOUS at 00:26

## 2023-01-18 RX ADMIN — ONDANSETRON 4 MG: 2 INJECTION INTRAMUSCULAR; INTRAVENOUS at 00:27

## 2023-01-18 RX ADMIN — POTASSIUM CHLORIDE 40 MEQ: 1500 TABLET, EXTENDED RELEASE ORAL at 01:12

## 2023-01-18 ASSESSMENT — ENCOUNTER SYMPTOMS
FEVER: 0
DYSURIA: 0
VOMITING: 1
NAUSEA: 1
DIARRHEA: 1
ABDOMINAL PAIN: 0

## 2023-01-18 ASSESSMENT — ACTIVITIES OF DAILY LIVING (ADL): ADLS_ACUITY_SCORE: 35

## 2023-01-18 NOTE — ED TRIAGE NOTES
"Pt presents to ER with son for complaints of vomiting over the last 24 hours. Endorses being dizzy and nauseated. No diarrhea. VSS. Feels very \"out of it\"      Triage Assessment     Row Name 01/17/23 2024       Triage Assessment (Adult)    Airway WDL WDL       Respiratory WDL    Respiratory WDL WDL       Skin Circulation/Temperature WDL    Skin Circulation/Temperature WDL WDL       Cardiac WDL    Cardiac WDL WDL       Peripheral/Neurovascular WDL    Peripheral Neurovascular WDL WDL       Cognitive/Neuro/Behavioral WDL    Cognitive/Neuro/Behavioral WDL WDL              "

## 2023-01-18 NOTE — DISCHARGE INSTRUCTIONS
Zofran as needed for nausea and vomiting.  Lots of water or electrolyte containing fluids like Gatorade or Pedialyte.  Typically these illnesses resolve on their own and you just need to support your body through it.  Return immediately if you have abdominal pain, fever greater than 101  F, uncontrolled vomiting or diarrhea, or any other new or concerning symptoms.  Otherwise, follow-up with your primary care provider to ensure you are improving as expected.  Increase dietary potassium with foods like spinach, bananas, and baked potatoes.

## 2023-01-18 NOTE — ED PROVIDER NOTES
History     Chief Complaint:  Nausea, Vomiting, & Diarrhea     The history is provided by the patient and a relative.      Stephanie Reed is a 52 year old female with history of GERD who presents with nausea, vomiting, diarrhea. Since last night she has had 3-4 episodes of vomiting and 1 episode of watery diarrhea. She started to feel dizzy and was worried about dehydration which prompted her concern. She has never had similar symptoms in the past but did eat purchased pizza for lunch. She has no abdominal pain, chest pain, dysuria, fever, or known sick contacts.    Independent Historian: Yes.     Review of External Notes: None     ROS:  Review of Systems   Constitutional: Negative for fever.   Cardiovascular: Negative for chest pain.   Gastrointestinal: Positive for diarrhea, nausea and vomiting. Negative for abdominal pain.   Genitourinary: Negative for dysuria.   Neurological: Positive for dizziness.   All other systems reviewed and are negative.    Allergies:  The patient has no known allergies.     Medications:    Pepcid  Prilosec    Past Medical History:    Carpal tunnel syndrome   Dry eye   Heartburn   Iron deficiency anemia    Menorrhagia   Nephrolithiasis   Arthritis  Denies history of DM and HTN    Past Surgical History:    Colonoscopy  Tubal ligation     Social History:  The patient presents to the ED with her son.  Does not smoke or drink alcohol    Physical Exam     Patient Vitals for the past 24 hrs:   BP Temp Temp src Pulse Resp SpO2   01/18/23 0259 -- -- -- -- -- 99 %   01/18/23 0231 133/86 -- -- 73 -- 98 %   01/18/23 0100 138/83 -- -- 77 -- 100 %   01/18/23 0014 -- -- -- -- -- 98 %   01/18/23 0012 132/82 -- -- 64 -- --   01/17/23 2023 121/73 98.5  F (36.9  C) Oral 69 20 99 %       Physical Exam  General: Well-developed and well-nourished. Fatigued appearing middle aged  woman. Cooperative.  Head:  Atraumatic.  Eyes:  Conjunctivae, lids, and sclerae are normal.  ENT:    Normal nose. Moist mucous  membranes.  Neck:  Supple. Normal range of motion.  CV:  Regular rate and rhythm. Normal heart sounds with no murmurs, rubs, or gallops detected.  Resp:  No respiratory distress. Clear to auscultation bilaterally without decreased breath sounds, wheezing, rales, or rhonchi.  GI:  Soft. Non-distended. Non-tender.    MS:  Normal ROM.  Skin:  Warm. Non-diaphoretic. No pallor.  Neuro: Awake. A&Ox3. Normal strength.  Psych:  Normal mood and affect. Normal speech.  Vitals reviewed.    Emergency Department Course     Laboratory:  Labs Ordered and Resulted from Time of ED Arrival to Time of ED Departure   COMPREHENSIVE METABOLIC PANEL - Abnormal       Result Value    Sodium 139      Potassium 3.1 (*)     Chloride 95 (*)     Carbon Dioxide (CO2) 28      Anion Gap 16 (*)     Urea Nitrogen 19.7      Creatinine 0.54      Calcium 9.2      Glucose 108 (*)     Alkaline Phosphatase 77      AST 24      ALT 22      Protein Total 8.6 (*)     Albumin 4.6      Bilirubin Total 0.4      GFR Estimate >90     CBC WITH PLATELETS AND DIFFERENTIAL - Abnormal    WBC Count 4.5      RBC Count 4.68      Hemoglobin 13.2      Hematocrit 39.8      MCV 85      MCH 28.2      MCHC 33.2      RDW 12.2      Platelet Count 280      % Neutrophils 77      % Lymphocytes 15      % Monocytes 8      % Eosinophils 0      % Basophils 0      % Immature Granulocytes 0      NRBCs per 100 WBC 0      Absolute Neutrophils 3.4      Absolute Lymphocytes 0.7 (*)     Absolute Monocytes 0.4      Absolute Eosinophils 0.0      Absolute Basophils 0.0      Absolute Immature Granulocytes 0.0      Absolute NRBCs 0.0     LIPASE - Normal    Lipase 32     LACTIC ACID WHOLE BLOOD - Normal    Lactic Acid 1.1        Emergency Department Course & Assessments:  Interventions:  Medications   ondansetron (ZOFRAN ODT) ODT tab 4 mg (4 mg Oral Given 1/17/23 2044)   0.9% sodium chloride BOLUS (1,000 mLs Intravenous Stopped 1/18/23 0126)   ondansetron (ZOFRAN) injection 4 mg (4 mg Intravenous  Given 1/18/23 0027)   famotidine (PEPCID) injection 20 mg (20 mg Intravenous Given 1/18/23 0028)   potassium chloride ER (KLOR-CON M) CR tablet 40 mEq (40 mEq Oral Given 1/18/23 0112)      Consultations/Discussion of Management or Tests:  ED Course as of 01/18/23 0315   Tue Jan 17, 2023   7680 I obtained history and examined the patient as noted above.     Wed Jan 18, 2023   0225 I rechecked the patient who is feeling improved.  She has tolerated PO and is comfortable with discharge.     Disposition:  The patient was discharged.     Impression & Plan    Medical Decision Making:  Stephanie is a 52 year old woman presenting with several hours of vomiting and diarrhea.  She is primarily concerned for dehydration as she started to feel dizzy.  She has no abdominal pain or fever.  She appears fatigued, though well, on exam.  She has no abdominal tenderness to warrant abdominal imaging.  She is afebrile.    Laboratory studies are significant for hypokalemia to 3.1 which was repleted with oral potassium.  There is no kidney injury, biliary obstruction, hepatitis, pancreatitis, leukocytosis, anemia, or lactic acidosis.    She did feel improved after Zofran, IV fluids, and Pepcid and was able to tolerate an oral challenge.  As such, she is appropriate for discharge.  I suspect this to be a viral or foodborne gastroenteritis and discussed supportive care for this likely self-limiting illness.  I have provided Zofran as needed for nausea and vomiting.  I encouraged her to increase dietary potassium.  I recommended she follow-up with her primary care provider to ensure she is improving as expected but she does understand she needs to return immediately if she has uncontrolled symptoms or new concerns.  All questions answered.  Amenable to discharge.    Diagnosis:    ICD-10-CM    1. Vomiting and diarrhea  R11.10     R19.7       2. Hypokalemia  E87.6            Discharge Medications:  Discharge Medication List as of 1/18/2023  2:54  AM      START taking these medications    Details   ondansetron (ZOFRAN ODT) 4 MG ODT tab Take 1 tablet (4 mg) by mouth every 6 hours as needed for nausea or vomiting, Disp-10 tablet, R-0, E-Prescribe              Scribe Disclosure:  I, Jordan Morrismichelle, am serving as a scribe at 12:26 AM on 1/18/2023 to document services personally performed by Monica Balderas MD based on my observations and the provider's statements to me.    1/18/2023   Monica Balderas MD Dixson, Kylie S, MD  01/31/23 0917

## 2023-01-31 ASSESSMENT — ENCOUNTER SYMPTOMS: DIZZINESS: 1

## 2023-03-28 ASSESSMENT — ENCOUNTER SYMPTOMS
ARTHRALGIAS: 0
HEMATOCHEZIA: 0
EYE PAIN: 0
WEAKNESS: 0
NERVOUS/ANXIOUS: 0
PALPITATIONS: 0
SORE THROAT: 0
NAUSEA: 0
JOINT SWELLING: 0
FEVER: 0
HEMATURIA: 0
CONSTIPATION: 0
FREQUENCY: 0
DIZZINESS: 0
PARESTHESIAS: 0
DYSURIA: 0
COUGH: 0
BREAST MASS: 0
HEARTBURN: 1
SHORTNESS OF BREATH: 1
HEADACHES: 0
DIARRHEA: 0
MYALGIAS: 0

## 2023-03-29 ENCOUNTER — OFFICE VISIT (OUTPATIENT)
Dept: FAMILY MEDICINE | Facility: CLINIC | Age: 53
End: 2023-03-29
Payer: COMMERCIAL

## 2023-03-29 VITALS
WEIGHT: 134.3 LBS | BODY MASS INDEX: 27.08 KG/M2 | SYSTOLIC BLOOD PRESSURE: 133 MMHG | DIASTOLIC BLOOD PRESSURE: 86 MMHG | RESPIRATION RATE: 16 BRPM | OXYGEN SATURATION: 99 % | HEART RATE: 85 BPM | HEIGHT: 59 IN | TEMPERATURE: 98.2 F

## 2023-03-29 DIAGNOSIS — Z12.4 SCREENING FOR CERVICAL CANCER: ICD-10-CM

## 2023-03-29 DIAGNOSIS — Z00.00 ROUTINE GENERAL MEDICAL EXAMINATION AT A HEALTH CARE FACILITY: Primary | ICD-10-CM

## 2023-03-29 DIAGNOSIS — M34.9 SCLERODERMA (H): ICD-10-CM

## 2023-03-29 DIAGNOSIS — M35.00 SICCA SYNDROME (H): ICD-10-CM

## 2023-03-29 DIAGNOSIS — Z12.31 VISIT FOR SCREENING MAMMOGRAM: ICD-10-CM

## 2023-03-29 DIAGNOSIS — M72.2 PLANTAR FASCIITIS: ICD-10-CM

## 2023-03-29 PROCEDURE — 87624 HPV HI-RISK TYP POOLED RSLT: CPT | Performed by: FAMILY MEDICINE

## 2023-03-29 PROCEDURE — 88175 CYTOPATH C/V AUTO FLUID REDO: CPT | Performed by: FAMILY MEDICINE

## 2023-03-29 PROCEDURE — 99396 PREV VISIT EST AGE 40-64: CPT | Performed by: FAMILY MEDICINE

## 2023-03-29 ASSESSMENT — ENCOUNTER SYMPTOMS
ARTHRALGIAS: 0
NERVOUS/ANXIOUS: 0
FEVER: 0
HEMATURIA: 0
HEMATOCHEZIA: 0
DYSURIA: 0
COUGH: 0
SHORTNESS OF BREATH: 1
HEARTBURN: 1
FREQUENCY: 0
MYALGIAS: 0
SORE THROAT: 0
DIARRHEA: 0
JOINT SWELLING: 0
CONSTIPATION: 0
BREAST MASS: 0
NAUSEA: 0
EYE PAIN: 0
PARESTHESIAS: 0
PALPITATIONS: 0
DIZZINESS: 0
WEAKNESS: 0
HEADACHES: 0

## 2023-03-29 NOTE — PATIENT INSTRUCTIONS

## 2023-03-29 NOTE — PROGRESS NOTES
"   SUBJECTIVE:   CC: Stephanie is an 52 year old who presents for preventive health visit.   Additional Questions 3/29/2023   Roomed by justyn   Accompanied by self     Healthy Habits:     Getting at least 3 servings of Calcium per day:  Yes    Bi-annual eye exam:  Yes    Dental care twice a year:  Yes    Sleep apnea or symptoms of sleep apnea:  None    Diet:  Vegetarian/vegan    Frequency of exercise:  None    Taking medications regularly:  Yes    PHQ-2 Total Score: 0      Dry mouth, sometimes a cough  - feels like the dryness leads to more coughing  - this then makes it hard to talk above a whisper    Had a small period of time where her stomach/belly felt \"hard\"  - felt hungry, but then didn't want to eat  Normal BM  - softer now, probably getting better, not sure what that was about  Vegan diet x 2 years    No periods - stopped in 2017    Very busy  Working at the [employer] 7 days/week  Took a vacation last year  Can take breaks if needed    R heel pain     Got flu shot in October with the Covid Booster - doesn't appear to be recorded    Today's PHQ-2 Score:   PHQ-2 ( 1999 Pfizer) 3/29/2023   Q1: Little interest or pleasure in doing things 0   Q2: Feeling down, depressed or hopeless 0   PHQ-2 Score 0   PHQ-2 Total Score (12-17 Years)- Positive if 3 or more points; Administer PHQ-A if positive -   Q1: Little interest or pleasure in doing things -   Q2: Feeling down, depressed or hopeless -   PHQ-2 Score -       Have you ever done Advance Care Planning? (For example, a Health Directive, POLST, or a discussion with a medical provider or your loved ones about your wishes): No, advance care planning information given to patient to review.  Patient plans to discuss their wishes with loved ones or provider.      Social History     Tobacco Use     Smoking status: Never     Smokeless tobacco: Never     Tobacco comments:      smokes outside only and not in the car    Substance Use Topics     Alcohol use: No       Alcohol " Use 3/28/2023   Prescreen: >3 drinks/day or >7 drinks/week? No     Reviewed orders with patient.  Reviewed health maintenance and updated orders accordingly - Yes  Patient Active Problem List   Diagnosis     Scleroderma (H)     Seasonal allergic rhinitis     Mixed connective tissue disease (H)     Vitamin D deficiency     Systemic lupus erythematosus (H)     Sicca syndrome (H)     Atypical chest pain     ERNST (dyspnea on exertion)     Raynaud's phenomenon without gangrene     Past Surgical History:   Procedure Laterality Date     COLONOSCOPY N/A 3/9/2021    Procedure: COLONOSCOPY, WITH POLYPECTOMY;  Surgeon: Patsy Mckenzie MD;  Location: [facility] OR     TUBAL LIGATION         Social History     Tobacco Use     Smoking status: Never     Smokeless tobacco: Never     Tobacco comments:      smokes outside only and not in the car    Vaping Use     Vaping status: Not on file   Substance Use Topics     Alcohol use: No     Family History   Adopted: Yes   Problem Relation Age of Onset     Unknown/Adopted Other      Glaucoma No family hx of      Macular Degeneration No family hx of            Breast Cancer Screening:    FHS-7: No flowsheet data found.  Mammogram Screening: Recommended annual mammography  Pertinent mammograms are reviewed under the imaging tab.    History of abnormal Pap smear: NO - age 30-65 PAP every 5 years with negative HPV co-testing recommended  PAP / HPV Latest Ref Rng & Units 8/16/2017 4/10/2014 12/22/2011   PAP (Historical) - NIL NIL NIL   HPV16 NEG:Negative Negative - -   HPV18 NEG:Negative Negative - -   HRHPV NEG:Negative Negative - -     Reviewed and updated as needed this visit by clinical staff   Tobacco  Allergies  Meds              Reviewed and updated as needed this visit by Provider                 Past Medical History:   Diagnosis Date     Carpal tunnel syndrome      Dry eye      Heartburn      Iron deficiency anemia      Long-term use of Plaquenil      Menorrhagia     with anemia  "    Nephrolithiasis         Review of Systems   Constitutional: Negative for fever.   HENT: Negative for congestion, ear pain, hearing loss and sore throat.    Eyes: Negative for pain and visual disturbance.   Respiratory: Positive for shortness of breath. Negative for cough.    Cardiovascular: Negative for chest pain, palpitations and peripheral edema.   Gastrointestinal: Positive for heartburn. Negative for constipation, diarrhea, hematochezia and nausea.   Breasts:  Negative for tenderness, breast mass and discharge.   Genitourinary: Negative for dysuria, frequency, genital sores, hematuria, pelvic pain, urgency, vaginal bleeding and vaginal discharge.   Musculoskeletal: Negative for arthralgias, joint swelling and myalgias.   Skin: Negative for rash.   Neurological: Negative for dizziness, weakness, headaches and paresthesias.   Psychiatric/Behavioral: Negative for mood changes. The patient is not nervous/anxious.         OBJECTIVE:   /86 (BP Location: Left arm, Patient Position: Sitting, Cuff Size: Adult Regular)   Pulse 85   Temp 98.2  F (36.8  C) (Oral)   Resp 16   Ht 1.49 m (4' 10.66\")   Wt 60.9 kg (134 lb 4.8 oz)   LMP 03/09/2019   SpO2 99%   BMI 27.44 kg/m    Physical Exam  GENERAL: healthy, alert and no distress  EYES: Eyes grossly normal to inspection, PERRL and conjunctivae and sclerae normal  HENT: ear canals and TM's normal, nose and mouth without ulcers or lesions  NECK: no adenopathy, no asymmetry, masses, or scars and thyroid normal to palpation  RESP: lungs clear to auscultation - no rales, rhonchi or wheezes  BREAST: normal without masses, tenderness or nipple discharge and no palpable axillary masses or adenopathy  CV: regular rate and rhythm, normal S1 S2, no S3 or S4, no murmur, click or rub, no peripheral edema and peripheral pulses strong  ABDOMEN: soft, nontender, no hepatosplenomegaly, no masses and bowel sounds normal   (female): normal female external genitalia, normal " "urethral meatus, vaginal mucosa pink, moist, well rugated, and normal cervix/adnexa/uterus without masses or discharge  MS: no gross musculoskeletal defects noted, no edema  SKIN: no suspicious lesions or rashes  NEURO: Normal strength and tone, mentation intact and speech normal  PSYCH: mentation appears normal, affect normal/bright        ASSESSMENT/PLAN:   (Z00.00) Routine general medical examination at a health care facility  (primary encounter diagnosis)  Comment: updated screening  Plan: follow up in 1 year    (Z12.31) Visit for screening mammogram  Comment:   Plan: Screening Mammogram Digital Bilateral    (Z12.4) Screening for cervical cancer  Comment:   Plan: PAP screen with HPV - recommended age 30 - 65         years, HPV Hold (Lab Only)    (M72.2) Plantar fasciitis  Comment: reviewed stretches to do at home and exercises, icing, prn NSAIDs  Plan: reconnect if not improving, will refer for PT    (M35.00) Sicca syndrome (H)  (M34.9) Scleroderma (H)  Plan: will reconnect with rheum, may benefit from resuming treatment for sicca        COUNSELING:  Reviewed preventive health counseling, as reflected in patient instructions      BMI:   Estimated body mass index is 27.44 kg/m  as calculated from the following:    Height as of this encounter: 1.49 m (4' 10.66\").    Weight as of this encounter: 60.9 kg (134 lb 4.8 oz).         She reports that she has never smoked. She has never used smokeless tobacco.          Tori Severino MD  [facility] RONALDOS  "

## 2023-04-03 LAB
BKR LAB AP GYN ADEQUACY: NORMAL
BKR LAB AP GYN INTERPRETATION: NORMAL
BKR LAB AP HPV REFLEX: NORMAL
BKR LAB AP PREVIOUS ABNORMAL: NORMAL
PATH REPORT.COMMENTS IMP SPEC: NORMAL
PATH REPORT.COMMENTS IMP SPEC: NORMAL
PATH REPORT.RELEVANT HX SPEC: NORMAL

## 2023-04-04 LAB
HUMAN PAPILLOMA VIRUS 16 DNA: NEGATIVE
HUMAN PAPILLOMA VIRUS 18 DNA: NEGATIVE
HUMAN PAPILLOMA VIRUS FINAL DIAGNOSIS: NORMAL
HUMAN PAPILLOMA VIRUS OTHER HR: NEGATIVE

## 2023-04-10 ENCOUNTER — ANCILLARY PROCEDURE (OUTPATIENT)
Dept: MAMMOGRAPHY | Facility: CLINIC | Age: 53
End: 2023-04-10
Attending: FAMILY MEDICINE
Payer: COMMERCIAL

## 2023-04-10 DIAGNOSIS — Z12.31 VISIT FOR SCREENING MAMMOGRAM: ICD-10-CM

## 2023-04-10 PROCEDURE — 77067 SCR MAMMO BI INCL CAD: CPT

## 2023-04-20 ENCOUNTER — ANCILLARY PROCEDURE (OUTPATIENT)
Dept: MAMMOGRAPHY | Facility: CLINIC | Age: 53
End: 2023-04-20
Attending: FAMILY MEDICINE
Payer: COMMERCIAL

## 2023-04-20 DIAGNOSIS — R92.8 ABNORMAL MAMMOGRAM OF LEFT BREAST: ICD-10-CM

## 2023-04-20 PROCEDURE — G0279 TOMOSYNTHESIS, MAMMO: HCPCS | Mod: LT | Performed by: STUDENT IN AN ORGANIZED HEALTH CARE EDUCATION/TRAINING PROGRAM

## 2023-04-20 PROCEDURE — 77065 DX MAMMO INCL CAD UNI: CPT | Mod: LT | Performed by: STUDENT IN AN ORGANIZED HEALTH CARE EDUCATION/TRAINING PROGRAM

## 2023-06-29 ENCOUNTER — OFFICE VISIT (OUTPATIENT)
Dept: FAMILY MEDICINE | Facility: CLINIC | Age: 53
End: 2023-06-29
Payer: COMMERCIAL

## 2023-06-29 VITALS
HEART RATE: 79 BPM | BODY MASS INDEX: 26.41 KG/M2 | SYSTOLIC BLOOD PRESSURE: 128 MMHG | TEMPERATURE: 98.6 F | HEIGHT: 59 IN | DIASTOLIC BLOOD PRESSURE: 79 MMHG | WEIGHT: 131 LBS | RESPIRATION RATE: 16 BRPM

## 2023-06-29 DIAGNOSIS — H81.11 BENIGN PAROXYSMAL POSITIONAL VERTIGO OF RIGHT EAR: Primary | ICD-10-CM

## 2023-06-29 PROCEDURE — 99213 OFFICE O/P EST LOW 20 MIN: CPT | Mod: GC

## 2023-06-29 RX ORDER — ONDANSETRON 4 MG/1
4 TABLET, ORALLY DISINTEGRATING ORAL EVERY 6 HOURS PRN
Qty: 30 TABLET | Refills: 0 | Status: SHIPPED | OUTPATIENT
Start: 2023-06-29 | End: 2023-07-19

## 2023-06-29 ASSESSMENT — PAIN SCALES - GENERAL: PAINLEVEL: MODERATE PAIN (5)

## 2023-06-29 NOTE — PATIENT INSTRUCTIONS
Patient Education   Here is the plan from today's visit    1. Benign paroxysmal positional vertigo of right ear  [email or web address]  Perform Epley maneuver as needed.  Take zofran every 6 hours as needed for nausea.    Please call or return to clinic if your symptoms don't go away.    Follow up plan  Return in about 2 weeks (around 7/13/2023).    Thank you for coming to [facility] today.  Lab Testing:  **If you had lab testing today and your results are reassuring or normal they will be mailed to you or sent through [employer] within 7 days.   **If the lab tests need quick action we will call you with the results.  **If you are having labs done on a different day, please call [phone] to schedule at [facility] or [phone] for other [facility] Outpatient Lab locations. Labs do not offer walk-in appointments.  The phone number we will call with results is # [phone] (home) none (work). If this is not the best number please call our clinic and change the number.  Medication Refills:  If you need any refills please call your pharmacy and they will contact us.   If you need to  your refill at a new pharmacy, please contact the new pharmacy directly. The new pharmacy will help you get your medications transferred faster.   Scheduling:  If you have any concerns about today's visit or wish to schedule another appointment please call our office during normal business hours [phone] (8-5:00 M-F). If you can no longer make a scheduled visit, please cancel via [employer] or call us to cancel.   If a referral was made to an [facility] specialty provider and you do not get a call from central scheduling, please refer to directions on your visit summary or call our office during normal business hours for assistance.   If a Mammogram was ordered for you at the Breast Center call [phone] to schedule or change your  appointment.  If you had an XRay/CT/Ultrasound/MRI ordered the number is [phone] to schedule or change your radiology appointment.   [facility] has limited ultrasound appointments available on Wednesdays, if you would like your ultrasound at [facility], please call [phone] to schedule.   Medical Concerns:  If you have urgent medical concerns please call [phone] at any time of the day.    Susie Christy MD

## 2023-06-29 NOTE — PROGRESS NOTES
Assessment & Plan     Benign paroxysmal positional vertigo of right ear  Patient presenting with 3 day episode of positional vertigo associated with tinnitus in right ear. Positive epley maneuver and considering that symptoms are related to position, history of prior similar episodes, and no associated hearing loss, BPPV most likely. However would not expect tinnitus with BPPV and Meniere's remains on the differential. Eustachian tube dysfunction or vestibulitis considered but less likely given no proceeding URI/allergy symptoms. Cerebellar/neuro exam normal, no headache, no concern for intracranial process. Attempted [place]-Hallpike to little effect, taught patient maneuver to repeat at home. Offered vestibular PT, patient declines at this time however may consider in the future if episodes return/worsen. Would also consider meclizine/ENT referral if hearing symptoms develop or tinnitus worsens. Discussed expected time course for improvement with BPPV and return precautions. Plan to follow up in 2 weeks for symptom re-check.  - ondansetron (ZOFRAN ODT) 4 MG ODT tab; Take 1 tablet (4 mg) by mouth every 6 hours as needed for nausea    Return in about 2 weeks (around 7/13/2023).    Susie Christy MD  [facility] ORLIN Brown is a 53 year old, presenting for the following health issues:  Dizziness (Since last Tuesday, struggles walking ) and Headache (On and off pain. Since last week. Pain scale 5 )        6/29/2023    10:19 AM   Additional Questions   Roomed by scar   Accompanied by self     HPI   -woke up Tuesday morning, sat up in bed and experienced dizziness. Ok when she was lying down, worse when she sits up or stands up  -some pain in right ear with ringing associated with her symptoms, otherwise outside of her episode no hearing changes  -no N/V  -no hx of stroke, heart disease  -hx of 2 prior episodes in the past similar to this  -hx of lupus, scleroderma, not on any medications  "currently for these    Objective    /79   Pulse 79   Temp 98.6  F (37  C) (Oral)   Resp 16   Ht 1.499 m (4' 11\")   Wt 59.4 kg (131 lb)   LMP 03/09/2019   BMI 26.46 kg/m    Body mass index is 26.46 kg/m .  Physical Exam   Constitutional: awake, alert, cooperative, no apparent distress, and appears stated age  Eyes: Lids and lashes normal, pupils equal, round and reactive to light, extra ocular muscles intact, sclera clear, conjunctiva normal. Right beating nystagmus observed with position change lying to sitting  ENT: normocepalic, without obvious abnormality  Hematologic / Lymphatic: no cervical lymphadenopathy and no supraclavicular lymphadenopathy  Respiratory: No increased work of breathing, good air exchange, clear to auscultation bilaterally, no crackles or wheezing  Cardiovascular: Normal apical impulse, regular rate and rhythm, normal S1 and S2, no S3 or S4, and no murmur noted  GI: soft, non-distended and non-tender  Musculoskeletal: no lower extremity pitting edema present  full range of motion noted  tone is normal  Neurologic: Awake, alert, oriented to name, place and time.  Cranial nerves II-XII are grossly intact.  Motor is 5 out of 5 bilaterally.  Sensory is intact.  Cerebellar exam wnl.      ----- Service Performed and Documented by Resident or Fellow ------            "

## 2023-07-18 RX ORDER — MECLIZINE HYDROCHLORIDE 25 MG/1
25 TABLET ORAL 3 TIMES DAILY PRN
Qty: 60 TABLET | Refills: 1 | Status: CANCELLED | OUTPATIENT
Start: 2023-07-18

## 2023-07-19 ENCOUNTER — OFFICE VISIT (OUTPATIENT)
Dept: FAMILY MEDICINE | Facility: CLINIC | Age: 53
End: 2023-07-19
Payer: COMMERCIAL

## 2023-07-19 VITALS
TEMPERATURE: 97.5 F | OXYGEN SATURATION: 100 % | HEIGHT: 59 IN | RESPIRATION RATE: 16 BRPM | WEIGHT: 135.1 LBS | HEART RATE: 71 BPM | DIASTOLIC BLOOD PRESSURE: 73 MMHG | SYSTOLIC BLOOD PRESSURE: 116 MMHG | BODY MASS INDEX: 27.24 KG/M2

## 2023-07-19 DIAGNOSIS — H81.11 BENIGN PAROXYSMAL POSITIONAL VERTIGO OF RIGHT EAR: ICD-10-CM

## 2023-07-19 DIAGNOSIS — Z71.85 IMMUNIZATION COUNSELING: Primary | ICD-10-CM

## 2023-07-19 PROCEDURE — 99213 OFFICE O/P EST LOW 20 MIN: CPT | Mod: GC

## 2023-07-19 RX ORDER — ONDANSETRON 4 MG/1
4 TABLET, ORALLY DISINTEGRATING ORAL EVERY 6 HOURS PRN
Qty: 30 TABLET | Refills: 0 | Status: SHIPPED | OUTPATIENT
Start: 2023-07-19

## 2023-07-19 ASSESSMENT — PAIN SCALES - GENERAL: PAINLEVEL: NO PAIN (0)

## 2023-07-19 NOTE — PATIENT INSTRUCTIONS
Patient Education   Here is the plan from today's visit    1. Benign paroxysmal positional vertigo of right ear  - ondansetron (ZOFRAN ODT) 4 MG ODT tab; Take 1 tablet (4 mg) by mouth every 6 hours as needed for nausea  Dispense: 30 tablet; Refill: 0          Please call or return to clinic if your symptoms don't go away.    Follow up plan  No follow-ups on file.    Thank you for coming to [facility] today.  Lab Testing:  **If you had lab testing today and your results are reassuring or normal they will be mailed to you or sent through [employer] within 7 days.   **If the lab tests need quick action we will call you with the results.  **If you are having labs done on a different day, please call [phone] to schedule at [facility] or [phone] for other [facility] Outpatient Lab locations. Labs do not offer walk-in appointments.  The phone number we will call with results is # [phone] (home) . If this is not the best number please call our clinic and change the number.  Medication Refills:  If you need any refills please call your pharmacy and they will contact us.   If you need to  your refill at a new pharmacy, please contact the new pharmacy directly. The new pharmacy will help you get your medications transferred faster.   Scheduling:  If you have any concerns about today's visit or wish to schedule another appointment please call our office during normal business hours [phone] (8-5:00 M-F). If you can no longer make a scheduled visit, please cancel via [employer] or call us to cancel.   If a referral was made to an [facility] specialty provider and you do not get a call from central scheduling, please refer to directions on your visit summary or call our office during normal business hours for assistance.   If a Mammogram was ordered for you at the Breast Center call [phone] to schedule or change your appointment.  If you had an XRay/CT/Ultrasound/MRI ordered the number  is [phone] to schedule or change your radiology appointment.   [facility] has limited ultrasound appointments available on Wednesdays, if you would like your ultrasound at [facility], please call [phone] to schedule.   Medical Concerns:  If you have urgent medical concerns please call [phone] at any time of the day.    Susie Christy MD

## 2023-07-19 NOTE — PROGRESS NOTES
"  Assessment & Plan     Benign paroxysmal positional vertigo of right ear  Check in on symptoms after original diagnosis 6/29/23. Symptoms have improved although still having some dizziness/nausea with sudden movements of her head. Doing the Lane-Hallpike maneuver at home and zofran is helping. Neuro exam today normal, no nystagmus elicited. Improved vestibular symptoms and exacerbation of symptoms with movement consistent with BPPV. Offered vestibular rehab but patient declined.  - ondansetron (ZOFRAN ODT) 4 MG ODT tab; Take 1 tablet (4 mg) by mouth every 6 hours as needed for nausea    Immunization counseling  Counseled that she should obtain shingles vaccine at any pharmacy that accepts her insurance.      Return for Routine preventive.    Susie Christy MD  [facility] ORLIN Brown is a 53 year old, presenting for the following health issues:  Follow Up        6/29/2023    10:19 AM   Additional Questions   Roomed by scar   Accompanied by self     HPI   Feeling better, not so dizzy,   If she turns fast it hhappens  appetitie a little down, some nausea, no vomiting, no falls  Been feeling a little sick        Objective    /73   Pulse 71   Temp 97.5  F (36.4  C)   Resp 16   Ht 1.499 m (4' 11\")   Wt 61.3 kg (135 lb 1.6 oz)   LMP 03/09/2019   SpO2 100%   BMI 27.29 kg/m    Body mass index is 27.29 kg/m .  Physical Exam   Constitutional: awake, alert, cooperative, no apparent distress, and appears stated age  Eyes: Lids and lashes normal, pupils equal, round and reactive to light, extra ocular muscles intact, sclera clear, conjunctiva normal  ENT: normocepalic, without obvious abnormality  Hematologic / Lymphatic: no cervical lymphadenopathy and no supraclavicular lymphadenopathy  Respiratory: No increased work of breathing, good air exchange, clear to auscultation bilaterally, no crackles or wheezing  Cardiovascular: Normal apical impulse, regular rate and rhythm, normal S1 " and S2, no S3 or S4, and no murmur noted  GI: soft, non-distended and non-tender  Musculoskeletal: no lower extremity pitting edema present  full range of motion noted  tone is normal  Neurologic: Awake, alert, oriented to name, place and time.  Cranial nerves II-XII are grossly intact.  Motor is 5 out of 5 bilaterally.  Sensory is intact.  Cerebellar exam negative.      ----- Service Performed and Documented by Resident or Fellow ------

## 2023-12-19 ENCOUNTER — OFFICE VISIT (OUTPATIENT)
Dept: URGENT CARE | Facility: URGENT CARE | Age: 53
End: 2023-12-19
Payer: COMMERCIAL

## 2023-12-19 VITALS
WEIGHT: 136.2 LBS | SYSTOLIC BLOOD PRESSURE: 128 MMHG | DIASTOLIC BLOOD PRESSURE: 80 MMHG | HEART RATE: 68 BPM | TEMPERATURE: 98.4 F | RESPIRATION RATE: 16 BRPM | OXYGEN SATURATION: 98 % | BODY MASS INDEX: 27.51 KG/M2

## 2023-12-19 DIAGNOSIS — R05.1 ACUTE COUGH: Primary | ICD-10-CM

## 2023-12-19 DIAGNOSIS — Z20.822 SUSPECTED 2019 NOVEL CORONAVIRUS INFECTION: ICD-10-CM

## 2023-12-19 DIAGNOSIS — R42 VERTIGO: ICD-10-CM

## 2023-12-19 LAB
FLUAV AG SPEC QL IA: NEGATIVE
FLUBV AG SPEC QL IA: NEGATIVE
SARS-COV-2 RNA RESP QL NAA+PROBE: NEGATIVE

## 2023-12-19 PROCEDURE — 87804 INFLUENZA ASSAY W/OPTIC: CPT | Mod: 59 | Performed by: FAMILY MEDICINE

## 2023-12-19 PROCEDURE — 87635 SARS-COV-2 COVID-19 AMP PRB: CPT | Performed by: FAMILY MEDICINE

## 2023-12-19 PROCEDURE — 87804 INFLUENZA ASSAY W/OPTIC: CPT | Performed by: FAMILY MEDICINE

## 2023-12-19 PROCEDURE — 99214 OFFICE O/P EST MOD 30 MIN: CPT | Performed by: FAMILY MEDICINE

## 2023-12-19 RX ORDER — ZOSTER VACCINE RECOMBINANT, ADJUVANTED 50 MCG/0.5
KIT INTRAMUSCULAR
COMMUNITY
Start: 2023-10-10 | End: 2024-01-17

## 2023-12-19 RX ORDER — INFLUENZA A VIRUS A/NEBRASKA/14/2019 (H1N1) ANTIGEN (MDCK CELL DERIVED, PROPIOLACTONE INACTIVATED), INFLUENZA A VIRUS A/DELAWARE/39/2019 (H3N2) ANTIGEN (MDCK CELL DERIVED, PROPIOLACTONE INACTIVATED), INFLUENZA B VIRUS B/SINGAPORE/INFTT-16-0610/2016 ANTIGEN (MDCK CELL DERIVED, PROPIOLACTONE INACTIVATED), INFLUENZA B VIRUS B/DARWIN/7/2019 ANTIGEN (MDCK CELL DERIVED, PROPIOLACTONE INACTIVATED) 15; 15; 15; 15 UG/.5ML; UG/.5ML; UG/.5ML; UG/.5ML
INJECTION, SUSPENSION INTRAMUSCULAR
COMMUNITY
Start: 2023-10-10 | End: 2024-01-17

## 2023-12-19 RX ORDER — MECLIZINE HYDROCHLORIDE 25 MG/1
25 TABLET ORAL 3 TIMES DAILY PRN
Qty: 21 TABLET | Refills: 0 | Status: SHIPPED | OUTPATIENT
Start: 2023-12-19 | End: 2023-12-26

## 2023-12-19 NOTE — PROGRESS NOTES
"SUBJECTIVE: Stephanie Reed is a 53 year old female presenting with a chief complaint of \"cold symptoms\" starting last wednesday.  Onset of symptoms for dizziness last Friday, worse with head movements  No CP/Ha    Past Medical History:   Diagnosis Date    Carpal tunnel syndrome     Dry eye     Heartburn     Iron deficiency anemia     Long-term use of Plaquenil     Menorrhagia     with anemia    Nephrolithiasis      No Known Allergies  Social History     Tobacco Use    Smoking status: Never    Smokeless tobacco: Never    Tobacco comments:      smokes outside only and not in the car    Substance Use Topics    Alcohol use: No       ROS:  No fevers    OBJECTIVE:  /80   Pulse 68   Temp 98.4  F (36.9  C) (Tympanic)   Resp 16   Wt 61.8 kg (136 lb 3.2 oz)   LMP 03/09/2019   SpO2 98%   BMI 27.51 kg/m  GENERAL APPEARANCE: healthy, alert and no distress  EYES: EOMI,  PERRL, conjunctiva clear  HENT: ear canals and TM's normal.  Nose and mouth without ulcers, erythema or lesions  RESP: lungs clear to auscultation - no rales, rhonchi or wheezes  CV: regular rates and rhythm, normal S1 S2, no murmur noted  NEURO: Normal strength and tone, sensory exam grossly normal,  normal speech and mentation  SKIN: no suspicious lesions or rashes      ICD-10-CM    1. Acute cough  R05.1 Influenza A & B Antigen - Clinic Collect     meclizine (ANTIVERT) 25 MG tablet      2. Suspected 2019 novel coronavirus infection  Z20.822 Symptomatic COVID-19 Virus (Coronavirus) by PCR Nose     meclizine (ANTIVERT) 25 MG tablet      3. Vertigo  R42 meclizine (ANTIVERT) 25 MG tablet          Fluids/Rest, f/u if worse/not any better    "

## 2024-01-17 ENCOUNTER — OFFICE VISIT (OUTPATIENT)
Dept: FAMILY MEDICINE | Facility: CLINIC | Age: 54
End: 2024-01-17
Payer: COMMERCIAL

## 2024-01-17 VITALS
DIASTOLIC BLOOD PRESSURE: 84 MMHG | RESPIRATION RATE: 12 BRPM | WEIGHT: 136.5 LBS | OXYGEN SATURATION: 100 % | TEMPERATURE: 98.8 F | HEIGHT: 59 IN | HEART RATE: 90 BPM | BODY MASS INDEX: 27.52 KG/M2 | SYSTOLIC BLOOD PRESSURE: 126 MMHG

## 2024-01-17 DIAGNOSIS — M34.9 SCLERODERMA (H): ICD-10-CM

## 2024-01-17 DIAGNOSIS — Z23 HIGH PRIORITY FOR 2019-NCOV VACCINE: ICD-10-CM

## 2024-01-17 DIAGNOSIS — R42 DIZZINESS: Primary | ICD-10-CM

## 2024-01-17 DIAGNOSIS — I73.00 RAYNAUD'S PHENOMENON WITHOUT GANGRENE: ICD-10-CM

## 2024-01-17 DIAGNOSIS — M32.9 SYSTEMIC LUPUS ERYTHEMATOSUS, UNSPECIFIED SLE TYPE, UNSPECIFIED ORGAN INVOLVEMENT STATUS (H): ICD-10-CM

## 2024-01-17 DIAGNOSIS — M35.1 MIXED CONNECTIVE TISSUE DISEASE (H): ICD-10-CM

## 2024-01-17 LAB
ERYTHROCYTE [DISTWIDTH] IN BLOOD BY AUTOMATED COUNT: 12.1 % (ref 10–15)
HBA1C MFR BLD: 6.3 % (ref 0–5.6)
HCT VFR BLD AUTO: 39.2 % (ref 35–47)
HGB BLD-MCNC: 12.7 G/DL (ref 11.7–15.7)
HOLD SPECIMEN: NORMAL
MCH RBC QN AUTO: 28.1 PG (ref 26.5–33)
MCHC RBC AUTO-ENTMCNC: 32.4 G/DL (ref 31.5–36.5)
MCV RBC AUTO: 87 FL (ref 78–100)
PLATELET # BLD AUTO: 247 10E3/UL (ref 150–450)
RBC # BLD AUTO: 4.52 10E6/UL (ref 3.8–5.2)
WBC # BLD AUTO: 3.2 10E3/UL (ref 4–11)

## 2024-01-17 PROCEDURE — 36415 COLL VENOUS BLD VENIPUNCTURE: CPT | Performed by: FAMILY MEDICINE

## 2024-01-17 PROCEDURE — 83036 HEMOGLOBIN GLYCOSYLATED A1C: CPT | Performed by: FAMILY MEDICINE

## 2024-01-17 PROCEDURE — 83550 IRON BINDING TEST: CPT | Performed by: FAMILY MEDICINE

## 2024-01-17 PROCEDURE — 85027 COMPLETE CBC AUTOMATED: CPT | Performed by: FAMILY MEDICINE

## 2024-01-17 PROCEDURE — 90480 ADMN SARSCOV2 VAC 1/ONLY CMP: CPT | Performed by: FAMILY MEDICINE

## 2024-01-17 PROCEDURE — 99214 OFFICE O/P EST MOD 30 MIN: CPT | Mod: 25 | Performed by: FAMILY MEDICINE

## 2024-01-17 PROCEDURE — 82728 ASSAY OF FERRITIN: CPT | Performed by: FAMILY MEDICINE

## 2024-01-17 PROCEDURE — 83540 ASSAY OF IRON: CPT | Performed by: FAMILY MEDICINE

## 2024-01-17 PROCEDURE — 84443 ASSAY THYROID STIM HORMONE: CPT | Performed by: FAMILY MEDICINE

## 2024-01-17 PROCEDURE — 91320 SARSCV2 VAC 30MCG TRS-SUC IM: CPT | Performed by: FAMILY MEDICINE

## 2024-01-17 PROCEDURE — 80053 COMPREHEN METABOLIC PANEL: CPT | Performed by: FAMILY MEDICINE

## 2024-01-17 NOTE — PROGRESS NOTES
"  Assessment & Plan     1. Dizziness  Appears to be positional, particularly with quick changes (from sitting to standing) - potentially orthostatic hypotension  Possibly related to recent URI (now slowly resolving)  Advise slower position changes, maintain good hydration  Keep symptom diary and check BP at home with symptoms   Labs today  - CBC with Platelets and Reflex to Iron Studies; Future  - Comprehensive metabolic panel; Future  - Hemoglobin A1c; Future  - TSH with free T4 reflex; Future  - CBC with Platelets and Reflex to Iron Studies  - Comprehensive metabolic panel  - Hemoglobin A1c  - TSH with free T4 reflex    2. High priority for 2019-nCoV vaccine  Updated booster    3. Scleroderma (H)  4. Systemic lupus erythematosus, unspecified SLE type, unspecified organ involvement status (H)  5. Mixed connective tissue disease (H24)  6. Raynaud's phenomenon without gangrene  All of these are stable, no worsening symptoms  Last seen by Rheum in 2022, advised prn follow up    Ordering of each unique test  30 minutes spent by me on the date of the encounter doing chart review, history and exam, documentation and further activities per the note      BMI  Estimated body mass index is 28.04 kg/m  as calculated from the following:    Height as of this encounter: 1.486 m (4' 10.5\").    Weight as of this encounter: 61.9 kg (136 lb 8 oz).       Return if symptoms worsen or fail to improve.    Martin Brown is a 53 year old, presenting for the following health issues:  Recheck Medication (Blood pressure)      1/17/2024     8:16 AM   Additional Questions   Roomed by robi   Accompanied by Self     HPI             Some URI/fatigue/dizziness right before the new year  - seen in  (12/19/23), given meds for vertigo (meclizine) which helped  - was COVID negative, influenza  - no meclizine x 1 week now, seems better over all  - however, over the past year has definitely noticed some episodes of dizziness with increasing " "frequency (though really only a few times/year), often happens with position changes  - No recent changes in scleroderma symptoms or lung function  - no changes in medications (except for addition of meclizine prn)    Also - during this time was seen at dentist early January  - systolic in 150s, advised to follow up  - has a cuff at home and noticed SBP ranging in 130s-150  - normal BP in our records  - sometimes links her dizzy symptoms to a slightly elevated SBP    No other concerns today  Due for COVID booster        Objective    /84 (BP Location: Left arm, Patient Position: Sitting, Cuff Size: Adult Large)   Pulse 90   Temp 98.8  F (37.1  C) (Oral)   Resp 12   Ht 1.486 m (4' 10.5\")   Wt 61.9 kg (136 lb 8 oz)   LMP 03/09/2019   SpO2 100%   BMI 28.04 kg/m    Body mass index is 28.04 kg/m .    ROS - negative x 7 body systems unless otherwise noted in HPI    Physical Exam  Constitutional:       General: She is not in acute distress.     Appearance: Normal appearance.   Eyes:      Extraocular Movements: Extraocular movements intact.   Cardiovascular:      Rate and Rhythm: Normal rate and regular rhythm.      Heart sounds: Normal heart sounds. No murmur heard.  Pulmonary:      Effort: Pulmonary effort is normal.      Breath sounds: Normal breath sounds. No wheezing or rales.   Musculoskeletal:         General: Normal range of motion.   Neurological:      General: No focal deficit present.      Mental Status: She is alert and oriented to person, place, and time.      Comments: Normal Blanco-Hallpike maneuvers - no nystagmus   Psychiatric:         Mood and Affect: Mood normal.         Behavior: Behavior normal.         Thought Content: Thought content normal.         Judgment: Judgment normal.                    Signed Electronically by: Tori Severino MD    "

## 2024-01-17 NOTE — PATIENT INSTRUCTIONS
Here is the plan from today's visit    1. Dizziness  Appears to be positional, particularly with quick changes (from sitting to standing) - potentially orthostatic hypotension (quick drop in BP), which may then over compensate when you sit back down  Possibly related to recent URI (now slowly resolving)  Advise slower position changes, maintain good hydration  Keep symptom diary and check BP at home with symptoms   Labs today  - CBC with Platelets and Reflex to Iron Studies; Future  - Comprehensive metabolic panel; Future  - Hemoglobin A1c; Future  - TSH with free T4 reflex; Future  - CBC with Platelets and Reflex to Iron Studies  - Comprehensive metabolic panel  - Hemoglobin A1c  - TSH with free T4 reflex    2. High priority for 2019-nCoV vaccine  Updated booster    3. Scleroderma (H)  4. Systemic lupus erythematosus, unspecified SLE type, unspecified organ involvement status (H)  5. Mixed connective tissue disease (H24)  6. Raynaud's phenomenon without gangrene  All of these are stable, no worsening symptoms  Last seen by Rheum in 2022, advised prn follow up      Please call or return to clinic if your symptoms don't go away.    Follow up plan  Follow up to discuss further if symptoms increase or aren't improving    Thank you for coming to [facility] today.  Lab Testing:  **If you had lab testing today and your results are reassuring or normal they will be mailed to you or sent through [employer] within 7 days.   **If the lab tests need quick action we will call you with the results.  The phone number we will call with results is # [phone] (home) . If this is not the best number please call our clinic and change the number.  Medication Refills:  If you need any refills please call your pharmacy and they will contact us.   If you need to  your refill at a new pharmacy, please contact the new pharmacy directly. The new pharmacy will help you get your medications transferred faster.   Scheduling:  If  you have any concerns about today's visit or wish to schedule another appointment please call our office during normal business hours [phone] (8-5:00 M-F)  If a referral was made to a [facility] and you don't get a call from central scheduling please call [phone].  If a Mammogram was ordered for you at [facility] call [phone] to schedule or change your appointment.  If you had an XRay/CT/Ultrasound/MRI ordered the number is [phone] to schedule or change your radiology appointment.   Medical Concerns:  If you have urgent medical concerns please call [phone] at any time of the day.  If you have a medical emergency please call [phone].

## 2024-01-18 LAB
ALBUMIN SERPL BCG-MCNC: 4.2 G/DL (ref 3.5–5.2)
ALP SERPL-CCNC: 82 U/L (ref 40–150)
ALT SERPL W P-5'-P-CCNC: 53 U/L (ref 0–50)
ANION GAP SERPL CALCULATED.3IONS-SCNC: 10 MMOL/L (ref 7–15)
AST SERPL W P-5'-P-CCNC: 50 U/L (ref 0–45)
BILIRUB SERPL-MCNC: 0.3 MG/DL
BUN SERPL-MCNC: 6.6 MG/DL (ref 6–20)
CALCIUM SERPL-MCNC: 9.3 MG/DL (ref 8.6–10)
CHLORIDE SERPL-SCNC: 104 MMOL/L (ref 98–107)
CREAT SERPL-MCNC: 0.54 MG/DL (ref 0.51–0.95)
DEPRECATED HCO3 PLAS-SCNC: 27 MMOL/L (ref 22–29)
EGFRCR SERPLBLD CKD-EPI 2021: >90 ML/MIN/1.73M2
FERRITIN SERPL-MCNC: 139 NG/ML (ref 11–328)
GLUCOSE SERPL-MCNC: 132 MG/DL (ref 70–99)
IRON BINDING CAPACITY (ROCHE): 259 UG/DL (ref 240–430)
IRON SATN MFR SERPL: 31 % (ref 15–46)
IRON SERPL-MCNC: 80 UG/DL (ref 37–145)
POTASSIUM SERPL-SCNC: 3.6 MMOL/L (ref 3.4–5.3)
PROT SERPL-MCNC: 8.3 G/DL (ref 6.4–8.3)
SODIUM SERPL-SCNC: 141 MMOL/L (ref 135–145)
TSH SERPL DL<=0.005 MIU/L-ACNC: 1.19 UIU/ML (ref 0.3–4.2)

## 2024-06-02 ENCOUNTER — HEALTH MAINTENANCE LETTER (OUTPATIENT)
Age: 54
End: 2024-06-02

## 2025-06-14 ENCOUNTER — HEALTH MAINTENANCE LETTER (OUTPATIENT)
Age: 55
End: 2025-06-14

## 2025-07-05 ENCOUNTER — HEALTH MAINTENANCE LETTER (OUTPATIENT)
Age: 55
End: 2025-07-05

## 2025-07-08 ENCOUNTER — HOSPITAL ENCOUNTER (OUTPATIENT)
Dept: MAMMOGRAPHY | Facility: CLINIC | Age: 55
Discharge: HOME | End: 2025-07-08
Attending: FAMILY MEDICINE
Payer: COMMERCIAL

## 2025-07-08 DIAGNOSIS — Z12.31 VISIT FOR SCREENING MAMMOGRAM: ICD-10-CM

## 2025-07-08 PROCEDURE — 77063 BREAST TOMOSYNTHESIS BI: CPT

## (undated) DEVICE — SOL WATER IRRIG 1000ML BOTTLE 2F7114

## (undated) DEVICE — SPECIMEN CONTAINER 3OZ W/FORMALIN 59901

## (undated) DEVICE — TUBING SUCTION 12"X1/4" N612

## (undated) DEVICE — KIT ENDO TURNOVER/PROCEDURE CARRY-ON 101822

## (undated) DEVICE — GOWN IMPERVIOUS 2XL BLUE

## (undated) DEVICE — FCP BIOPSY RADIAL JAW 4 JUMBO 3.2MM CHANNEL M00513370

## (undated) DEVICE — SUCTION MANIFOLD NEPTUNE 2 SYS 1 PORT 702-025-000

## (undated) RX ORDER — FENTANYL CITRATE 50 UG/ML
INJECTION, SOLUTION INTRAMUSCULAR; INTRAVENOUS
Status: DISPENSED
Start: 2021-03-09